# Patient Record
Sex: MALE | Race: WHITE | NOT HISPANIC OR LATINO | URBAN - METROPOLITAN AREA
[De-identification: names, ages, dates, MRNs, and addresses within clinical notes are randomized per-mention and may not be internally consistent; named-entity substitution may affect disease eponyms.]

---

## 2017-03-25 ENCOUNTER — COMMUNICATION - HEALTHEAST (OUTPATIENT)
Dept: INTERNAL MEDICINE | Facility: CLINIC | Age: 82
End: 2017-03-25

## 2017-03-25 DIAGNOSIS — I10 HYPERTENSION: ICD-10-CM

## 2017-04-09 ENCOUNTER — COMMUNICATION - HEALTHEAST (OUTPATIENT)
Dept: INTERNAL MEDICINE | Facility: CLINIC | Age: 82
End: 2017-04-09

## 2017-04-09 DIAGNOSIS — E03.9 UNSPECIFIED HYPOTHYROIDISM: ICD-10-CM

## 2017-04-10 ENCOUNTER — COMMUNICATION - HEALTHEAST (OUTPATIENT)
Dept: INTERNAL MEDICINE | Facility: CLINIC | Age: 82
End: 2017-04-10

## 2017-04-10 DIAGNOSIS — I10 ESSENTIAL HYPERTENSION: ICD-10-CM

## 2017-04-17 ENCOUNTER — COMMUNICATION - HEALTHEAST (OUTPATIENT)
Dept: INTERNAL MEDICINE | Facility: CLINIC | Age: 82
End: 2017-04-17

## 2017-04-17 DIAGNOSIS — E03.9 UNSPECIFIED HYPOTHYROIDISM: ICD-10-CM

## 2017-04-17 DIAGNOSIS — I10 ESSENTIAL HYPERTENSION: ICD-10-CM

## 2017-05-25 ENCOUNTER — OFFICE VISIT - HEALTHEAST (OUTPATIENT)
Dept: INTERNAL MEDICINE | Facility: CLINIC | Age: 82
End: 2017-05-25

## 2017-05-25 DIAGNOSIS — E11.9 DIABETES MELLITUS, TYPE 2 (H): ICD-10-CM

## 2017-05-25 LAB
CHOLEST SERPL-MCNC: 162 MG/DL
FASTING STATUS PATIENT QL REPORTED: ABNORMAL
HBA1C MFR BLD: 6 % (ref 3.5–6)
HDLC SERPL-MCNC: 42 MG/DL
LDLC SERPL CALC-MCNC: 88 MG/DL
TRIGL SERPL-MCNC: 162 MG/DL

## 2017-05-25 ASSESSMENT — MIFFLIN-ST. JEOR: SCORE: 1370.21

## 2017-05-26 ENCOUNTER — COMMUNICATION - HEALTHEAST (OUTPATIENT)
Dept: INTERNAL MEDICINE | Facility: CLINIC | Age: 82
End: 2017-05-26

## 2017-06-07 ENCOUNTER — COMMUNICATION - HEALTHEAST (OUTPATIENT)
Dept: INTERNAL MEDICINE | Facility: CLINIC | Age: 82
End: 2017-06-07

## 2017-06-23 ENCOUNTER — OFFICE VISIT - HEALTHEAST (OUTPATIENT)
Dept: INTERNAL MEDICINE | Facility: CLINIC | Age: 82
End: 2017-06-23

## 2017-06-23 DIAGNOSIS — E11.9 DIABETES MELLITUS, TYPE 2 (H): ICD-10-CM

## 2017-06-23 ASSESSMENT — MIFFLIN-ST. JEOR: SCORE: 1356.24

## 2017-06-24 ENCOUNTER — COMMUNICATION - HEALTHEAST (OUTPATIENT)
Dept: INTERNAL MEDICINE | Facility: CLINIC | Age: 82
End: 2017-06-24

## 2017-09-06 ENCOUNTER — COMMUNICATION - HEALTHEAST (OUTPATIENT)
Dept: INTERNAL MEDICINE | Facility: CLINIC | Age: 82
End: 2017-09-06

## 2017-09-26 ENCOUNTER — COMMUNICATION - HEALTHEAST (OUTPATIENT)
Dept: INTERNAL MEDICINE | Facility: CLINIC | Age: 82
End: 2017-09-26

## 2017-09-26 DIAGNOSIS — E03.9 UNSPECIFIED HYPOTHYROIDISM: ICD-10-CM

## 2017-11-16 ENCOUNTER — OFFICE VISIT - HEALTHEAST (OUTPATIENT)
Dept: INTERNAL MEDICINE | Facility: CLINIC | Age: 82
End: 2017-11-16

## 2017-11-16 ENCOUNTER — COMMUNICATION - HEALTHEAST (OUTPATIENT)
Dept: INTERNAL MEDICINE | Facility: CLINIC | Age: 82
End: 2017-11-16

## 2017-11-16 ENCOUNTER — AMBULATORY - HEALTHEAST (OUTPATIENT)
Dept: INTERNAL MEDICINE | Facility: CLINIC | Age: 82
End: 2017-11-16

## 2017-11-16 DIAGNOSIS — E11.9 DIABETES MELLITUS, TYPE 2 (H): ICD-10-CM

## 2017-11-16 DIAGNOSIS — F03.90 DEMENTIA (H): ICD-10-CM

## 2017-11-16 LAB
CHOLEST SERPL-MCNC: 138 MG/DL
FASTING STATUS PATIENT QL REPORTED: YES
HBA1C MFR BLD: 5.9 % (ref 3.5–6)
HDLC SERPL-MCNC: 34 MG/DL
LDLC SERPL CALC-MCNC: 58 MG/DL
PSA SERPL-MCNC: 0.8 NG/ML (ref 0–6.5)
TRIGL SERPL-MCNC: 230 MG/DL

## 2017-11-16 RX ORDER — DIPHENHYDRAMINE HCL 25 MG
25 CAPSULE ORAL EVERY 6 HOURS PRN
Status: SHIPPED | COMMUNITY
Start: 2017-11-16

## 2017-11-16 ASSESSMENT — MIFFLIN-ST. JEOR: SCORE: 1317.68

## 2017-11-17 ENCOUNTER — COMMUNICATION - HEALTHEAST (OUTPATIENT)
Dept: INTERNAL MEDICINE | Facility: CLINIC | Age: 82
End: 2017-11-17

## 2017-11-21 ENCOUNTER — RECORDS - HEALTHEAST (OUTPATIENT)
Dept: ADMINISTRATIVE | Facility: OTHER | Age: 82
End: 2017-11-21

## 2017-11-24 ENCOUNTER — OFFICE VISIT - HEALTHEAST (OUTPATIENT)
Dept: INTERNAL MEDICINE | Facility: CLINIC | Age: 82
End: 2017-11-24

## 2017-11-24 DIAGNOSIS — I10 ESSENTIAL HYPERTENSION: ICD-10-CM

## 2017-11-24 ASSESSMENT — MIFFLIN-ST. JEOR: SCORE: 1313.15

## 2018-02-26 ENCOUNTER — COMMUNICATION - HEALTHEAST (OUTPATIENT)
Dept: INTERNAL MEDICINE | Facility: CLINIC | Age: 83
End: 2018-02-26

## 2018-03-25 ENCOUNTER — COMMUNICATION - HEALTHEAST (OUTPATIENT)
Dept: INTERNAL MEDICINE | Facility: CLINIC | Age: 83
End: 2018-03-25

## 2018-03-25 DIAGNOSIS — N40.0 BPH (BENIGN PROSTATIC HYPERPLASIA): ICD-10-CM

## 2018-03-25 DIAGNOSIS — I10 ESSENTIAL HYPERTENSION: ICD-10-CM

## 2018-03-25 DIAGNOSIS — E03.9 HYPOTHYROIDISM: ICD-10-CM

## 2018-03-25 RX ORDER — ATENOLOL 50 MG/1
50 TABLET ORAL DAILY
Qty: 90 TABLET | Refills: 2 | Status: SHIPPED | OUTPATIENT
Start: 2018-03-25

## 2018-04-23 ENCOUNTER — COMMUNICATION - HEALTHEAST (OUTPATIENT)
Dept: INTERNAL MEDICINE | Facility: CLINIC | Age: 83
End: 2018-04-23

## 2018-04-23 ENCOUNTER — RECORDS - HEALTHEAST (OUTPATIENT)
Dept: ADMINISTRATIVE | Facility: OTHER | Age: 83
End: 2018-04-23

## 2018-05-02 ENCOUNTER — AMBULATORY - HEALTHEAST (OUTPATIENT)
Dept: INTERNAL MEDICINE | Facility: CLINIC | Age: 83
End: 2018-05-02

## 2018-05-03 ENCOUNTER — COMMUNICATION - HEALTHEAST (OUTPATIENT)
Dept: INTERNAL MEDICINE | Facility: CLINIC | Age: 83
End: 2018-05-03

## 2018-05-03 ENCOUNTER — OFFICE VISIT - HEALTHEAST (OUTPATIENT)
Dept: INTERNAL MEDICINE | Facility: CLINIC | Age: 83
End: 2018-05-03

## 2018-05-03 DIAGNOSIS — Z01.810 PREOP CARDIOVASCULAR EXAM: ICD-10-CM

## 2018-05-03 LAB
CHOLEST SERPL-MCNC: 148 MG/DL
FASTING STATUS PATIENT QL REPORTED: YES
HBA1C MFR BLD: 6.4 % (ref 3.5–6)
HDLC SERPL-MCNC: 39 MG/DL
LDLC SERPL CALC-MCNC: 76 MG/DL
POTASSIUM BLD-SCNC: 4.5 MMOL/L (ref 3.5–5)
TRIGL SERPL-MCNC: 166 MG/DL

## 2018-05-03 ASSESSMENT — MIFFLIN-ST. JEOR: SCORE: 1344.9

## 2018-05-08 ENCOUNTER — RECORDS - HEALTHEAST (OUTPATIENT)
Dept: ADMINISTRATIVE | Facility: OTHER | Age: 83
End: 2018-05-08

## 2018-05-11 ENCOUNTER — RECORDS - HEALTHEAST (OUTPATIENT)
Dept: ADMINISTRATIVE | Facility: OTHER | Age: 83
End: 2018-05-11

## 2018-05-27 ENCOUNTER — COMMUNICATION - HEALTHEAST (OUTPATIENT)
Dept: INTERNAL MEDICINE | Facility: CLINIC | Age: 83
End: 2018-05-27

## 2018-05-27 DIAGNOSIS — E78.5 HYPERLIPIDEMIA: ICD-10-CM

## 2018-05-31 ENCOUNTER — RECORDS - HEALTHEAST (OUTPATIENT)
Dept: ADMINISTRATIVE | Facility: OTHER | Age: 83
End: 2018-05-31

## 2018-06-07 ENCOUNTER — RECORDS - HEALTHEAST (OUTPATIENT)
Dept: ADMINISTRATIVE | Facility: OTHER | Age: 83
End: 2018-06-07

## 2018-06-11 ENCOUNTER — OFFICE VISIT - HEALTHEAST (OUTPATIENT)
Dept: INTERNAL MEDICINE | Facility: CLINIC | Age: 83
End: 2018-06-11

## 2018-06-11 ENCOUNTER — COMMUNICATION - HEALTHEAST (OUTPATIENT)
Dept: INTERNAL MEDICINE | Facility: CLINIC | Age: 83
End: 2018-06-11

## 2018-06-11 ENCOUNTER — OFFICE VISIT - HEALTHEAST (OUTPATIENT)
Dept: CARDIOLOGY | Facility: CLINIC | Age: 83
End: 2018-06-11

## 2018-06-11 DIAGNOSIS — I49.9 IRREGULAR HEART BEAT: ICD-10-CM

## 2018-06-11 DIAGNOSIS — E11.9 DIABETES MELLITUS, TYPE 2 (H): ICD-10-CM

## 2018-06-11 LAB
ATRIAL RATE - MUSE: 66 BPM
CHOLEST SERPL-MCNC: 143 MG/DL
DIASTOLIC BLOOD PRESSURE - MUSE: NORMAL MMHG
FASTING STATUS PATIENT QL REPORTED: YES
FASTING STATUS PATIENT QL REPORTED: YES
GLUCOSE BLD-MCNC: 145 MG/DL (ref 70–125)
HBA1C MFR BLD: 6.3 % (ref 3.5–6)
HDLC SERPL-MCNC: 37 MG/DL
INTERPRETATION ECG - MUSE: NORMAL
LDLC SERPL CALC-MCNC: 79 MG/DL
P AXIS - MUSE: 0 DEGREES
PR INTERVAL - MUSE: 240 MS
QRS DURATION - MUSE: 124 MS
QT - MUSE: 452 MS
QTC - MUSE: 473 MS
R AXIS - MUSE: -27 DEGREES
SYSTOLIC BLOOD PRESSURE - MUSE: NORMAL MMHG
T AXIS - MUSE: -6 DEGREES
TRIGL SERPL-MCNC: 135 MG/DL
VENTRICULAR RATE- MUSE: 66 BPM

## 2018-06-11 ASSESSMENT — MIFFLIN-ST. JEOR
SCORE: 1335.83
SCORE: 1331.29

## 2018-06-12 ENCOUNTER — COMMUNICATION - HEALTHEAST (OUTPATIENT)
Dept: INTERNAL MEDICINE | Facility: CLINIC | Age: 83
End: 2018-06-12

## 2018-06-24 ENCOUNTER — COMMUNICATION - HEALTHEAST (OUTPATIENT)
Dept: INTERNAL MEDICINE | Facility: CLINIC | Age: 83
End: 2018-06-24

## 2018-06-24 DIAGNOSIS — E03.9 HYPOTHYROIDISM: ICD-10-CM

## 2018-07-16 ENCOUNTER — OFFICE VISIT - HEALTHEAST (OUTPATIENT)
Dept: INTERNAL MEDICINE | Facility: CLINIC | Age: 83
End: 2018-07-16

## 2018-07-16 DIAGNOSIS — E11.9 DIABETES MELLITUS, TYPE 2 (H): ICD-10-CM

## 2018-07-16 LAB
CHOLEST SERPL-MCNC: 126 MG/DL
FASTING STATUS PATIENT QL REPORTED: ABNORMAL
FASTING STATUS PATIENT QL REPORTED: NORMAL
GLUCOSE BLD-MCNC: 108 MG/DL (ref 70–125)
HBA1C MFR BLD: 6 % (ref 3.5–6)
HDLC SERPL-MCNC: 31 MG/DL
LDLC SERPL CALC-MCNC: 63 MG/DL
TRIGL SERPL-MCNC: 161 MG/DL

## 2018-07-16 ASSESSMENT — MIFFLIN-ST. JEOR: SCORE: 1317.68

## 2018-07-17 ENCOUNTER — COMMUNICATION - HEALTHEAST (OUTPATIENT)
Dept: INTERNAL MEDICINE | Facility: CLINIC | Age: 83
End: 2018-07-17

## 2018-08-26 ENCOUNTER — COMMUNICATION - HEALTHEAST (OUTPATIENT)
Dept: INTERNAL MEDICINE | Facility: CLINIC | Age: 83
End: 2018-08-26

## 2018-08-26 DIAGNOSIS — E78.5 HYPERLIPIDEMIA: ICD-10-CM

## 2018-09-17 ENCOUNTER — OFFICE VISIT - HEALTHEAST (OUTPATIENT)
Dept: INTERNAL MEDICINE | Facility: CLINIC | Age: 83
End: 2018-09-17

## 2018-09-17 DIAGNOSIS — E11.9 DIABETES MELLITUS, TYPE 2 (H): ICD-10-CM

## 2018-09-17 DIAGNOSIS — R00.1 BRADYCARDIA: ICD-10-CM

## 2018-09-17 LAB
ATRIAL RATE - MUSE: 66 BPM
CHOLEST SERPL-MCNC: 130 MG/DL
DIASTOLIC BLOOD PRESSURE - MUSE: NORMAL MMHG
FASTING STATUS PATIENT QL REPORTED: YES
FASTING STATUS PATIENT QL REPORTED: YES
GLUCOSE BLD-MCNC: 118 MG/DL (ref 70–125)
HBA1C MFR BLD: 6.5 % (ref 3.5–6)
HDLC SERPL-MCNC: 39 MG/DL
INTERPRETATION ECG - MUSE: NORMAL
LDLC SERPL CALC-MCNC: 60 MG/DL
P AXIS - MUSE: 15 DEGREES
PR INTERVAL - MUSE: 218 MS
QRS DURATION - MUSE: 116 MS
QT - MUSE: 412 MS
QTC - MUSE: 431 MS
R AXIS - MUSE: -21 DEGREES
SYSTOLIC BLOOD PRESSURE - MUSE: NORMAL MMHG
T AXIS - MUSE: 20 DEGREES
TRIGL SERPL-MCNC: 154 MG/DL
TSH SERPL DL<=0.005 MIU/L-ACNC: 1.33 UIU/ML (ref 0.3–5)
VENTRICULAR RATE- MUSE: 66 BPM
VIT B12 SERPL-MCNC: 629 PG/ML (ref 213–816)

## 2018-09-17 ASSESSMENT — MIFFLIN-ST. JEOR: SCORE: 1313.15

## 2018-09-18 ENCOUNTER — COMMUNICATION - HEALTHEAST (OUTPATIENT)
Dept: INTERNAL MEDICINE | Facility: CLINIC | Age: 83
End: 2018-09-18

## 2018-09-21 ENCOUNTER — COMMUNICATION - HEALTHEAST (OUTPATIENT)
Dept: INTERNAL MEDICINE | Facility: CLINIC | Age: 83
End: 2018-09-21

## 2018-09-21 ENCOUNTER — OFFICE VISIT - HEALTHEAST (OUTPATIENT)
Dept: CARDIOLOGY | Facility: CLINIC | Age: 83
End: 2018-09-21

## 2018-09-21 DIAGNOSIS — I49.8 VENTRICULAR BIGEMINY: ICD-10-CM

## 2018-09-21 DIAGNOSIS — I25.10 MILD CAD: ICD-10-CM

## 2018-09-21 DIAGNOSIS — I49.3 FREQUENT PVCS: ICD-10-CM

## 2018-09-21 DIAGNOSIS — I10 ESSENTIAL HYPERTENSION: ICD-10-CM

## 2018-09-21 DIAGNOSIS — R00.1 BRADYCARDIA: ICD-10-CM

## 2018-09-21 DIAGNOSIS — E03.9 HYPOTHYROIDISM: ICD-10-CM

## 2018-09-21 ASSESSMENT — MIFFLIN-ST. JEOR: SCORE: 1317.68

## 2018-09-25 ENCOUNTER — HOSPITAL ENCOUNTER (OUTPATIENT)
Dept: CARDIOLOGY | Facility: CLINIC | Age: 83
Discharge: HOME | End: 2018-09-25
Attending: INTERNAL MEDICINE

## 2018-09-25 DIAGNOSIS — I49.8 VENTRICULAR BIGEMINY: ICD-10-CM

## 2018-09-25 DIAGNOSIS — I49.3 FREQUENT PVCS: ICD-10-CM

## 2018-09-25 DIAGNOSIS — I49.9 CARDIAC ARRHYTHMIA, UNSPECIFIED: ICD-10-CM

## 2018-09-25 LAB
AORTIC ROOT: 3.3 CM
AORTIC VALVE MEAN VELOCITY: 80.1 CM/S
ASCENDING AORTA: 3.6 CM
AV DIMENSIONLESS INDEX VTI: 0.7
AV MEAN GRADIENT: 3 MMHG
AV PEAK GRADIENT: 4.4 MMHG
AV VALVE AREA: 1.7 CM2
AV VELOCITY RATIO: 0.8
BSA FOR ECHO PROCEDURE: 1.77 M2
DOP CALC AO PEAK VEL: 105 CM/S
DOP CALC AO VTI: 25.2 CM
DOP CALC LVOT AREA: 2.54 CM2
DOP CALC LVOT DIAMETER: 1.8 CM
DOP CALC LVOT PEAK VEL: 83.2 CM/S
DOP CALC LVOT STROKE VOLUME: 42.2 CM3
DOP CALCLVOT PEAK VEL VTI: 16.6 CM
ECHO EJECTION FRACTION ESTIMATED: 50 %
EJECTION FRACTION: 44 % (ref 55–75)
FRACTIONAL SHORTENING: 33.3 % (ref 28–44)
INTERVENTRICULAR SEPTUM IN END DIASTOLE: 1 CM (ref 0.6–1)
IVS/PW RATIO: 1
LA AREA 1: 21.8 CM2
LA AREA 2: 20.3 CM2
LEFT ATRIUM LENGTH: 5.28 CM
LEFT ATRIUM SIZE: 3.4 CM
LEFT ATRIUM VOLUME INDEX: 40.2 ML/M2
LEFT ATRIUM VOLUME: 71.2 ML
LEFT VENTRICLE CARDIAC INDEX: 1.9 L/MIN/M2
LEFT VENTRICLE CARDIAC OUTPUT: 3.4 L/MIN
LEFT VENTRICLE DIASTOLIC VOLUME INDEX: 51.4 CM3/M2 (ref 34–74)
LEFT VENTRICLE DIASTOLIC VOLUME: 91 CM3 (ref 62–150)
LEFT VENTRICLE HEART RATE: 80 BPM
LEFT VENTRICLE MASS INDEX: 86.6 G/M2
LEFT VENTRICLE SYSTOLIC VOLUME INDEX: 28.8 CM3/M2 (ref 11–31)
LEFT VENTRICLE SYSTOLIC VOLUME: 51 CM3 (ref 21–61)
LEFT VENTRICULAR INTERNAL DIMENSION IN DIASTOLE: 4.5 CM (ref 4.2–5.8)
LEFT VENTRICULAR INTERNAL DIMENSION IN SYSTOLE: 3 CM (ref 2.5–4)
LEFT VENTRICULAR MASS: 153.3 G
LEFT VENTRICULAR OUTFLOW TRACT MEAN GRADIENT: 1 MMHG
LEFT VENTRICULAR OUTFLOW TRACT MEAN VELOCITY: 51.1 CM/S
LEFT VENTRICULAR OUTFLOW TRACT PEAK GRADIENT: 3 MMHG
LEFT VENTRICULAR POSTERIOR WALL IN END DIASTOLE: 1 CM (ref 0.6–1)
LV STROKE VOLUME INDEX: 23.9 ML/M2
MITRAL VALVE E/A RATIO: 0.7
MV AVERAGE E/E' RATIO: 14.1 CM/S
MV DECELERATION TIME: 250 MS
MV E'TISSUE VEL-LAT: 5.36 CM/S
MV E'TISSUE VEL-MED: 4.39 CM/S
MV LATERAL E/E' RATIO: 12.8
MV MEDIAL E/E' RATIO: 15.6
MV PEAK A VELOCITY: 93.3 CM/S
MV PEAK E VELOCITY: 68.6 CM/S
TRICUSPID VALVE ANULAR PLANE SYSTOLIC EXCURSION: 1.7 CM

## 2018-10-02 ENCOUNTER — AMBULATORY - HEALTHEAST (OUTPATIENT)
Dept: CARDIOLOGY | Facility: CLINIC | Age: 83
End: 2018-10-02

## 2018-10-02 DIAGNOSIS — I49.3 PVC'S (PREMATURE VENTRICULAR CONTRACTIONS): ICD-10-CM

## 2018-10-03 ENCOUNTER — COMMUNICATION - HEALTHEAST (OUTPATIENT)
Dept: CARDIOLOGY | Facility: CLINIC | Age: 83
End: 2018-10-03

## 2018-10-03 DIAGNOSIS — I49.3 PVC'S (PREMATURE VENTRICULAR CONTRACTIONS): ICD-10-CM

## 2018-10-08 ENCOUNTER — AMBULATORY - HEALTHEAST (OUTPATIENT)
Dept: CARDIOLOGY | Facility: CLINIC | Age: 83
End: 2018-10-08

## 2018-10-08 ENCOUNTER — COMMUNICATION - HEALTHEAST (OUTPATIENT)
Dept: INTERNAL MEDICINE | Facility: CLINIC | Age: 83
End: 2018-10-08

## 2018-10-08 DIAGNOSIS — I49.3 PVC'S (PREMATURE VENTRICULAR CONTRACTIONS): ICD-10-CM

## 2018-10-08 LAB
ATRIAL RATE - MUSE: 68 BPM
DIASTOLIC BLOOD PRESSURE - MUSE: NORMAL MMHG
INTERPRETATION ECG - MUSE: NORMAL
P AXIS - MUSE: 58 DEGREES
PR INTERVAL - MUSE: 214 MS
QRS DURATION - MUSE: 118 MS
QT - MUSE: 446 MS
QTC - MUSE: 474 MS
R AXIS - MUSE: -14 DEGREES
SYSTOLIC BLOOD PRESSURE - MUSE: NORMAL MMHG
T AXIS - MUSE: -8 DEGREES
VENTRICULAR RATE- MUSE: 68 BPM

## 2018-10-08 ASSESSMENT — MIFFLIN-ST. JEOR: SCORE: 1322.22

## 2018-10-22 ENCOUNTER — HOSPITAL ENCOUNTER (OUTPATIENT)
Dept: CARDIOLOGY | Facility: CLINIC | Age: 83
Discharge: HOME | End: 2018-10-22
Attending: INTERNAL MEDICINE

## 2018-10-22 DIAGNOSIS — I49.3 PVC'S (PREMATURE VENTRICULAR CONTRACTIONS): ICD-10-CM

## 2018-10-30 ENCOUNTER — OFFICE VISIT - HEALTHEAST (OUTPATIENT)
Dept: INTERNAL MEDICINE | Facility: CLINIC | Age: 83
End: 2018-10-30

## 2018-10-30 DIAGNOSIS — E11.9 DIABETES MELLITUS, TYPE 2 (H): ICD-10-CM

## 2018-10-30 ASSESSMENT — MIFFLIN-ST. JEOR: SCORE: 1304.07

## 2018-11-06 ENCOUNTER — COMMUNICATION - HEALTHEAST (OUTPATIENT)
Dept: CARDIOLOGY | Facility: CLINIC | Age: 83
End: 2018-11-06

## 2018-12-20 ENCOUNTER — COMMUNICATION - HEALTHEAST (OUTPATIENT)
Dept: INTERNAL MEDICINE | Facility: CLINIC | Age: 83
End: 2018-12-20

## 2018-12-20 DIAGNOSIS — N40.0 BPH (BENIGN PROSTATIC HYPERPLASIA): ICD-10-CM

## 2018-12-20 DIAGNOSIS — E03.9 HYPOTHYROIDISM: ICD-10-CM

## 2019-02-18 ENCOUNTER — COMMUNICATION - HEALTHEAST (OUTPATIENT)
Dept: INTERNAL MEDICINE | Facility: CLINIC | Age: 84
End: 2019-02-18

## 2019-03-20 ENCOUNTER — COMMUNICATION - HEALTHEAST (OUTPATIENT)
Dept: INTERNAL MEDICINE | Facility: CLINIC | Age: 84
End: 2019-03-20

## 2019-03-20 DIAGNOSIS — E03.9 HYPOTHYROIDISM: ICD-10-CM

## 2019-04-18 ENCOUNTER — OFFICE VISIT - HEALTHEAST (OUTPATIENT)
Dept: INTERNAL MEDICINE | Facility: CLINIC | Age: 84
End: 2019-04-18

## 2019-04-18 DIAGNOSIS — I49.3 PVC'S (PREMATURE VENTRICULAR CONTRACTIONS): ICD-10-CM

## 2019-04-18 DIAGNOSIS — E11.8 TYPE 2 DIABETES MELLITUS WITH COMPLICATION, WITHOUT LONG-TERM CURRENT USE OF INSULIN (H): ICD-10-CM

## 2019-04-18 LAB
CHOLEST SERPL-MCNC: 145 MG/DL
CREAT UR-MCNC: 135.3 MG/DL
FASTING STATUS PATIENT QL REPORTED: ABNORMAL
FASTING STATUS PATIENT QL REPORTED: ABNORMAL
GLUCOSE BLD-MCNC: 128 MG/DL (ref 70–125)
HBA1C MFR BLD: 6.1 % (ref 3.5–6)
HDLC SERPL-MCNC: 36 MG/DL
LDLC SERPL CALC-MCNC: 67 MG/DL
MICROALBUMIN UR-MCNC: 2.18 MG/DL (ref 0–1.99)
MICROALBUMIN/CREAT UR: 16.1 MG/G
TRIGL SERPL-MCNC: 210 MG/DL

## 2019-04-18 ASSESSMENT — MIFFLIN-ST. JEOR: SCORE: 1322.22

## 2019-04-19 ENCOUNTER — COMMUNICATION - HEALTHEAST (OUTPATIENT)
Dept: INTERNAL MEDICINE | Facility: CLINIC | Age: 84
End: 2019-04-19

## 2019-05-01 ENCOUNTER — RECORDS - HEALTHEAST (OUTPATIENT)
Dept: HEALTH INFORMATION MANAGEMENT | Facility: CLINIC | Age: 84
End: 2019-05-01

## 2019-06-18 ENCOUNTER — COMMUNICATION - HEALTHEAST (OUTPATIENT)
Dept: INTERNAL MEDICINE | Facility: CLINIC | Age: 84
End: 2019-06-18

## 2019-06-18 DIAGNOSIS — E03.9 HYPOTHYROIDISM: ICD-10-CM

## 2019-07-18 ENCOUNTER — OFFICE VISIT - HEALTHEAST (OUTPATIENT)
Dept: INTERNAL MEDICINE | Facility: CLINIC | Age: 84
End: 2019-07-18

## 2019-07-18 DIAGNOSIS — E11.8 TYPE 2 DIABETES MELLITUS WITH COMPLICATION, WITHOUT LONG-TERM CURRENT USE OF INSULIN (H): ICD-10-CM

## 2019-07-18 DIAGNOSIS — I10 ESSENTIAL HYPERTENSION: ICD-10-CM

## 2019-07-18 LAB
CHOLEST SERPL-MCNC: 153 MG/DL
FASTING STATUS PATIENT QL REPORTED: YES
FASTING STATUS PATIENT QL REPORTED: YES
GLUCOSE BLD-MCNC: 128 MG/DL (ref 70–125)
HBA1C MFR BLD: 6.1 % (ref 3.5–6)
HDLC SERPL-MCNC: 35 MG/DL
LDLC SERPL CALC-MCNC: 69 MG/DL
TRIGL SERPL-MCNC: 244 MG/DL

## 2019-07-18 ASSESSMENT — MIFFLIN-ST. JEOR: SCORE: 1313.15

## 2019-07-19 ENCOUNTER — COMMUNICATION - HEALTHEAST (OUTPATIENT)
Dept: INTERNAL MEDICINE | Facility: CLINIC | Age: 84
End: 2019-07-19

## 2019-08-25 ENCOUNTER — COMMUNICATION - HEALTHEAST (OUTPATIENT)
Dept: INTERNAL MEDICINE | Facility: CLINIC | Age: 84
End: 2019-08-25

## 2019-08-25 DIAGNOSIS — E78.5 HYPERLIPIDEMIA: ICD-10-CM

## 2019-08-25 RX ORDER — SIMVASTATIN 40 MG
TABLET ORAL
Qty: 90 TABLET | Refills: 4 | Status: SHIPPED | OUTPATIENT
Start: 2019-08-25

## 2019-09-11 ENCOUNTER — COMMUNICATION - HEALTHEAST (OUTPATIENT)
Dept: INTERNAL MEDICINE | Facility: CLINIC | Age: 84
End: 2019-09-11

## 2019-09-11 DIAGNOSIS — I49.3 PVC'S (PREMATURE VENTRICULAR CONTRACTIONS): ICD-10-CM

## 2019-10-28 ENCOUNTER — OFFICE VISIT - HEALTHEAST (OUTPATIENT)
Dept: INTERNAL MEDICINE | Facility: CLINIC | Age: 84
End: 2019-10-28

## 2019-10-28 DIAGNOSIS — E11.69 TYPE 2 DIABETES MELLITUS WITH OTHER SPECIFIED COMPLICATION, WITHOUT LONG-TERM CURRENT USE OF INSULIN (H): ICD-10-CM

## 2019-10-28 LAB
CHOLEST SERPL-MCNC: 156 MG/DL
FASTING STATUS PATIENT QL REPORTED: YES
FASTING STATUS PATIENT QL REPORTED: YES
GLUCOSE BLD-MCNC: 140 MG/DL (ref 70–125)
HBA1C MFR BLD: 6.1 % (ref 3.5–6)
HDLC SERPL-MCNC: 39 MG/DL
LDLC SERPL CALC-MCNC: 75 MG/DL
TRIGL SERPL-MCNC: 208 MG/DL

## 2019-10-28 ASSESSMENT — MIFFLIN-ST. JEOR: SCORE: 1285.93

## 2019-10-29 ENCOUNTER — COMMUNICATION - HEALTHEAST (OUTPATIENT)
Dept: INTERNAL MEDICINE | Facility: CLINIC | Age: 84
End: 2019-10-29

## 2020-01-17 ENCOUNTER — COMMUNICATION - HEALTHEAST (OUTPATIENT)
Dept: INTERNAL MEDICINE | Facility: CLINIC | Age: 85
End: 2020-01-17

## 2020-01-17 DIAGNOSIS — I49.3 PVC'S (PREMATURE VENTRICULAR CONTRACTIONS): ICD-10-CM

## 2020-01-27 ENCOUNTER — COMMUNICATION - HEALTHEAST (OUTPATIENT)
Dept: INTERNAL MEDICINE | Facility: CLINIC | Age: 85
End: 2020-01-27

## 2020-01-27 ENCOUNTER — COMMUNICATION - HEALTHEAST (OUTPATIENT)
Dept: SCHEDULING | Facility: CLINIC | Age: 85
End: 2020-01-27

## 2020-01-27 DIAGNOSIS — I49.3 PVC'S (PREMATURE VENTRICULAR CONTRACTIONS): ICD-10-CM

## 2020-01-27 DIAGNOSIS — E03.9 HYPOTHYROIDISM: ICD-10-CM

## 2020-01-27 DIAGNOSIS — N40.0 BPH (BENIGN PROSTATIC HYPERPLASIA): ICD-10-CM

## 2020-01-27 RX ORDER — TAMSULOSIN HYDROCHLORIDE 0.4 MG/1
CAPSULE ORAL
Qty: 90 CAPSULE | Refills: 3 | Status: SHIPPED | OUTPATIENT
Start: 2020-01-27

## 2020-02-04 ENCOUNTER — COMMUNICATION - HEALTHEAST (OUTPATIENT)
Dept: INTERNAL MEDICINE | Facility: CLINIC | Age: 85
End: 2020-02-04

## 2020-02-05 ENCOUNTER — COMMUNICATION - HEALTHEAST (OUTPATIENT)
Dept: INTERNAL MEDICINE | Facility: CLINIC | Age: 85
End: 2020-02-05

## 2020-02-05 DIAGNOSIS — I49.3 PVC'S (PREMATURE VENTRICULAR CONTRACTIONS): ICD-10-CM

## 2020-02-05 DIAGNOSIS — E03.9 HYPOTHYROIDISM: ICD-10-CM

## 2020-02-05 RX ORDER — DONEPEZIL HYDROCHLORIDE 5 MG/1
5 TABLET, FILM COATED ORAL AT BEDTIME
Qty: 90 TABLET | Refills: 3 | Status: SHIPPED | OUTPATIENT
Start: 2020-02-05

## 2020-02-05 RX ORDER — LEVOTHYROXINE SODIUM 50 UG/1
TABLET ORAL
Qty: 90 TABLET | Refills: 3 | Status: SHIPPED | OUTPATIENT
Start: 2020-02-05

## 2020-05-17 ENCOUNTER — COMMUNICATION - HEALTHEAST (OUTPATIENT)
Dept: INTERNAL MEDICINE | Facility: CLINIC | Age: 85
End: 2020-05-17

## 2020-05-17 DIAGNOSIS — I49.3 PVC'S (PREMATURE VENTRICULAR CONTRACTIONS): ICD-10-CM

## 2020-05-19 RX ORDER — SOTALOL HYDROCHLORIDE 80 MG/1
TABLET ORAL
Qty: 90 TABLET | Refills: 3 | Status: SHIPPED | OUTPATIENT
Start: 2020-05-19

## 2021-05-27 NOTE — PROGRESS NOTES
Office Visit - Follow up    Duane S Kilde   83 y.o. male    Date of Visit: 4/18/2019    Chief Complaint   Patient presents with     Diabetes     fasting     Hypertension       Subjective: Diabetes mellitus type 2.    Does not check Accu-Cheks at home.    Hypertension.  Questions regarding sotalol versus atenolol.    The patient was advised to continue sotalol as recommended by the cardiologist.  While on sotalol the atenolol should be discontinued.    No blood in stool or urine no chest pain or shortness of breath medication list reviewed well-tolerated normal effects.    ROS: A comprehensive review of systems was performed and was otherwise negative    Medications:  Prior to Admission medications    Medication Sig Start Date End Date Taking? Authorizing Provider   aspirin 81 MG EC tablet Take 81 mg by mouth daily.   Yes PROVIDER, HISTORICAL   atenolol (TENORMIN) 50 MG tablet Take 1 tablet (50 mg total) by mouth daily. 3/25/18  Yes Dima Hudson MD   diphenhydrAMINE (BENADRYL) 25 mg capsule Take 25 mg by mouth every 6 (six) hours as needed for itching (twice daily).    Yes PROVIDER, HISTORICAL   donepezil (ARICEPT) 5 MG tablet TAKE 1 TABLET AT BEDTIME 2/19/19  Yes Dima Hudson MD   FOLIC ACID/MULTIVITS-MIN/LUT (CENTRUM SILVER ORAL) Take by mouth.   Yes PROVIDER, HISTORICAL   levothyroxine (SYNTHROID, LEVOTHROID) 50 MCG tablet TAKE 1 TABLET DAILY 3/21/19  Yes Dima Hudson MD   simvastatin (ZOCOR) 40 MG tablet Take 1 tablet (40 mg total) by mouth at bedtime. 8/26/18  Yes Dima Hudson MD   sotalol (BETAPACE) 80 MG tablet Take 0.5 tablets (40 mg total) by mouth 2 (two) times a day. 4/18/19  Yes Dima Hudson MD   tamsulosin (FLOMAX) 0.4 mg cap TAKE 1 CAPSULE DAILY 12/21/18  Yes Dima Hudson MD   sotalol (BETAPACE) 80 MG tablet Take 0.5 tablets (40 mg total) by mouth 2 (two) times a day. 10/6/18 4/18/19 Yes Harleen Esquivel MD   indomethacin (INDOCIN) 50 MG capsule Take 1  capsule (50 mg total) by mouth daily. For gout 1/15/15   Dima Hudson MD   niacin (NIASPAN) 500 MG CR tablet Take 1 tablet by mouth.  4/18/19  PROVIDER, HISTORICAL       Allergies:   Allergies   Allergen Reactions     Niacin Diarrhea       Immunizations:   Immunization History   Administered Date(s) Administered     DT (pediatric) 06/17/2004     Influenza, inj, historic,unspecified 09/22/2009     Pneumo Conj 13-V (2010&after) 06/23/2017     Pneumo Polysac 23-V 06/17/2004     Td,adult,historic,unspecified 02/28/2011     Tdap 02/27/2014     ZOSTER, LIVE 05/22/2013       Exam Chest clear to auscultation and percussion.  Heart tones regular rhythm without murmur rub or gallop.  Abdomen soft nontender no organomegaly.  No peritoneal signs.  Extremities free of edema cyanosis or clubbing.  Neck veins nondistended no thyromegaly or scleral icterus noted, carotids full.  Skin warm and dry easily conversant good spirited.  Normal intelligence.  Neurologically intact no gross localizing findings.  Apical pulse 48 respiratory rate 18 and unlabored O2 sats 98% blood pressure 124/74 BMI ideal at 24.53.  Weight up 4 pounds.  His eyes water often.    Assessment and Plan  Diabetes mellitus type 2 with hypertension and frequent PVCs.  If the patient elects sotalol then he should stop atenolol.  Check A1c blood sugar lipid panel urine for microalbumin today.    Niacin allergy.    History of gout currently asymptomatic.    Time: total time spent with the patient was 25 minutes of which >50% was spent in counseling and coordination of care    The following high BMI interventions were performed this visit: encouragement to exercise    Dima Hudson MD    Patient Active Problem List   Diagnosis     Hypothyroidism     Type 2 Diabetes Mellitus     Hyperlipidemia     Essential Hypertension     Gout     Precordial pain     Frequent PVCs     Ventricular bigeminy     Mild CAD     Bradycardia

## 2021-05-30 NOTE — PROGRESS NOTES
Office Visit - Follow up    Duane S Kilde   84 y.o. male    Date of Visit: 7/18/2019    Chief Complaint   Patient presents with     Diabetes     fasting     Hypertension       Subjective: Diabetes mellitus type 2 with hypertension.    Last laboratory tests from April 18, 2019 reviewed.    Diabetic foot examination all clear.  The patient has cognitive decline accompanied by his wife the history is thereby limited.    On direct questioning no chest pain or shortness of breath no blood in stool or urine she denies polyuria or polydipsia.    Medication list reviewed reconciled.  Allergy to niacin.    No flares of gout there is a lesion over the third digit near the third digit meta carpal phalangeal joint MCP joint proximal is consistent with a tophi.  It is not draining it not secondarily infected or irritated not inflamed.    ROS: A comprehensive review of systems was performed and was otherwise negative    Medications:  Prior to Admission medications    Medication Sig Start Date End Date Taking? Authorizing Provider   aspirin 81 MG EC tablet Take 81 mg by mouth daily.   Yes PROVIDER, HISTORICAL   atenolol (TENORMIN) 50 MG tablet Take 1 tablet (50 mg total) by mouth daily. 3/25/18  Yes Dima Hudson MD   diphenhydrAMINE (BENADRYL) 25 mg capsule Take 25 mg by mouth every 6 (six) hours as needed for itching (twice daily).    Yes PROVIDER, HISTORICAL   donepezil (ARICEPT) 5 MG tablet TAKE 1 TABLET AT BEDTIME 2/19/19  Yes Dima Hudson MD   FOLIC ACID/MULTIVITS-MIN/LUT (CENTRUM SILVER ORAL) Take by mouth.   Yes PROVIDER, HISTORICAL   levothyroxine (SYNTHROID, LEVOTHROID) 50 MCG tablet TAKE 1 TABLET DAILY 6/19/19  Yes Dima Hudson MD   simvastatin (ZOCOR) 40 MG tablet Take 1 tablet (40 mg total) by mouth at bedtime. 8/26/18  Yes Dima Hudson MD   sotalol (BETAPACE) 80 MG tablet Take 0.5 tablets (40 mg total) by mouth 2 (two) times a day. 4/18/19  Yes Dima Hudson MD   tamsulosin  (FLOMAX) 0.4 mg cap TAKE 1 CAPSULE DAILY 12/21/18  Yes Dima Hudson MD   indomethacin (INDOCIN) 50 MG capsule Take 1 capsule (50 mg total) by mouth daily. For gout 1/15/15   Dima Hudson MD       Allergies:   Allergies   Allergen Reactions     Niacin Diarrhea       Immunizations:   Immunization History   Administered Date(s) Administered     DT (pediatric) 06/17/2004     Influenza, inj, historic,unspecified 09/22/2009     Pneumo Conj 13-V (2010&after) 06/23/2017     Pneumo Polysac 23-V 06/17/2004     Td,adult,historic,unspecified 02/28/2011     Tdap 02/27/2014     ZOSTER, LIVE 05/22/2013       Exam Chest clear to auscultation and percussion.  Heart tones regular rhythm without murmur rub or gallop.  Abdomen soft nontender no organomegaly.  No peritoneal signs.  Extremities free of edema cyanosis or clubbing.  Neck veins nondistended no thyromegaly or scleral icterus noted, carotids full.  Skin warm and dry easily conversant good spirited.  Normal intelligence.  Neurologically intact no gross localizing findings.  Male pattern baldness 120/60 pulse 4836 recheck 50.  O2 sats 97% atenolol dose is noted and recommended to be decreased also patient on Betapace it may be necessary for the patient to stop the Betapace and/or stop date atenolol because of slow heart rate.  No other slow heart rate inducing medications on board.    Assessment and Plan  Diabetes mellitus type 2 check A1c blood sugar lipid panel today.    Niacin allergy\diarrhea.    Hypertension controlled 120/60 with    Bradycardia exacerbated by atenolol and Betapace therapy.    Cognitive decline\dementia.  Wife fills in from [employer].  RTC in 3 months time fasting.    Time: total time spent with the patient was 25 minutes of which >50% was spent in counseling and coordination of care    The following high BMI interventions were performed this visit: encouragement to exercise    Dima Hudson MD    Patient Active Problem List    Diagnosis     Hypothyroidism     Type 2 Diabetes Mellitus     Hyperlipidemia     Essential Hypertension     Gout     Precordial pain     Frequent PVCs     Ventricular bigeminy     Mild CAD     Bradycardia

## 2021-05-31 VITALS — WEIGHT: 159.08 LBS | BODY MASS INDEX: 24.97 KG/M2 | HEIGHT: 67 IN

## 2021-05-31 VITALS — WEIGHT: 156 LBS | HEIGHT: 67 IN | BODY MASS INDEX: 24.48 KG/M2

## 2021-05-31 VITALS — HEIGHT: 66 IN | WEIGHT: 151 LBS | BODY MASS INDEX: 24.27 KG/M2

## 2021-05-31 VITALS — HEIGHT: 66 IN | WEIGHT: 150 LBS | BODY MASS INDEX: 24.11 KG/M2

## 2021-06-01 VITALS — WEIGHT: 157 LBS | HEIGHT: 66 IN | BODY MASS INDEX: 25.23 KG/M2

## 2021-06-01 VITALS — HEIGHT: 66 IN | BODY MASS INDEX: 24.75 KG/M2 | WEIGHT: 154 LBS

## 2021-06-01 VITALS — HEIGHT: 66 IN | BODY MASS INDEX: 24.91 KG/M2 | WEIGHT: 155 LBS

## 2021-06-01 VITALS — WEIGHT: 151 LBS | BODY MASS INDEX: 24.27 KG/M2 | HEIGHT: 66 IN

## 2021-06-01 NOTE — TELEPHONE ENCOUNTER
Medication Request  Medication name:   sotalol (BETAPACE) 80 MG tablet 45 tablet 3 4/18/2019     Sig - Route: Take 0.5 tablets (40 mg total) by mouth 2 (two) times a day. - Oral    Sent to pharmacy as: sotalol (BETAPACE) 80 MG tablet    Notes to Pharmacy: Have 12 lead ECG done in clinic on 10/8 am after 4th dose    E-Prescribing Status: Receipt confirmed by pharmacy (4/18/2019  8:58 AM CDT)        Pharmacy Name and Location: [facility]   Reason for request: Son is requesting a short script to a local [facility] as patient is out of this medication and the [facility] stated It will take at least 10 days to get this.   When did you use medication last?:  Son was calling and he found an empty bottle that alerted him to call.   Patient offered appointment:  no son is calling making this request.   Okay to leave a detailed message: yes

## 2021-06-01 NOTE — TELEPHONE ENCOUNTER
Tried calling the phone # for [place]- phone # not in service    LMOM for patient that rx was sent to his local pharmacy today

## 2021-06-02 VITALS — BODY MASS INDEX: 24.11 KG/M2 | WEIGHT: 150 LBS | HEIGHT: 66 IN

## 2021-06-02 VITALS — WEIGHT: 148 LBS | HEIGHT: 66 IN | BODY MASS INDEX: 23.78 KG/M2

## 2021-06-02 VITALS — HEIGHT: 66 IN | WEIGHT: 152 LBS | BODY MASS INDEX: 24.43 KG/M2

## 2021-06-02 VITALS — WEIGHT: 151 LBS | BODY MASS INDEX: 24.27 KG/M2 | HEIGHT: 66 IN

## 2021-06-02 NOTE — PROGRESS NOTES
Office Visit - Follow up    Duane S Kilde   84 y.o. male    Date of Visit: 10/28/2019    Chief Complaint   Patient presents with     Diabetes     fasting     Hypertension     Hyperlipidemia     Hypothyroidism       Subjective: Diabetes mellitus type 2 with hypertension and hypothyroidism.    Wife  2019 postoperatively.  She had been to [facility].    Last labs for this patient reviewed from 2019.    There are some cysts noted over the dorsal aspect of his joints at the metacarpal phalangeal joint #3.  Appears to be a ganglion cyst or synovial cyst patient reassured not hot not red.    No blood in stool or urine denies chest pain or shortness of breath.  Medication list reviewed well-tolerated no ill effects and reconciled.    One son moved back to [place] with his wife where they live near [facility].    One brother lives in [facility] and the patient himself in [facility].    ROS: A comprehensive review of systems was performed and was otherwise negative    Medications:  Prior to Admission medications    Medication Sig Start Date End Date Taking? Authorizing Provider   aspirin 81 MG EC tablet Take 81 mg by mouth daily.   Yes PROVIDER, HISTORICAL   atenolol (TENORMIN) 50 MG tablet Take 1 tablet (50 mg total) by mouth daily. 3/25/18  Yes Dima Hudson MD   diphenhydrAMINE (BENADRYL) 25 mg capsule Take 25 mg by mouth every 6 (six) hours as needed for itching (twice daily).    Yes PROVIDER, HISTORICAL   FOLIC ACID/MULTIVITS-MIN/LUT (CENTRUM SILVER ORAL) Take by mouth.   Yes PROVIDER, HISTORICAL   levothyroxine (SYNTHROID, LEVOTHROID) 50 MCG tablet TAKE 1 TABLET DAILY 19  Yes Dima Hudson MD   simvastatin (ZOCOR) 40 MG tablet TAKE 1 TABLET AT BEDTIME 19  Yes Dima Hudson MD   sotalol (BETAPACE) 80 MG tablet Take 0.5 tablets (40 mg total) by mouth 2 (two) times a day. 19  Yes Chaparro Cintron MD   donepezil (ARICEPT) 5 MG tablet TAKE 1  TABLET AT BEDTIME 19   Dima Hudson MD   indomethacin (INDOCIN) 50 MG capsule Take 1 capsule (50 mg total) by mouth daily. For gout 1/15/15   Dima Hudson MD   tamsulosin (FLOMAX) 0.4 mg cap TAKE 1 CAPSULE DAILY 18   Dima Hudson MD       Allergies:   Allergies   Allergen Reactions     Niacin Diarrhea       Immunizations:   Immunization History   Administered Date(s) Administered     DT (pediatric) 2004     Influenza, inj, historic,unspecified 2009     Influenza,trivalent,im, 65+yrs 09/10/2019     Pneumo Conj 13-V (2010&after) 2017     Pneumo Polysac 23-V 2004     Td,adult,historic,unspecified 2011     Tdap 2014     ZOSTER, LIVE 2013       Exam Chest clear to auscultation and percussion.  Heart tones regular rhythm without murmur rub or gallop.  Abdomen soft nontender no organomegaly.  No peritoneal signs.  Extremities free of edema cyanosis or clubbing.  Neck veins nondistended no thyromegaly or scleral icterus noted, carotids full.  Skin warm and dry easily conversant good spirited.  Normal intelligence.  Neurologically intact no gross localizing findings.    122/70 pulse 46 respirations 18 O2 sats 99%.  Weight down 6 pounds from previous.  Encourage adequate dietary intake plus hydration.  There is a allergy to niacin.    Assessment and Plan  Diabetes mellitus plus hypertension and hypothyroidism.  Check diabetic labs today including A1c blood sugar lipid panel.    Niacin allergy.    Grief reaction wife  2019 postoperative state condolences given to patient.    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  Or inflammation.  Patient reassured.  It is small.  Does not interfere with the function of the right hand right hand dominant.  Return to clinic in 2 to 6 months.            Dima Hudson  MD    Patient Active Problem List   Diagnosis     Hypothyroidism     Type 2 Diabetes Mellitus     Hyperlipidemia     Essential Hypertension     Gout     Precordial pain     Frequent PVCs     Ventricular bigeminy     Mild CAD     Bradycardia

## 2021-06-03 VITALS — WEIGHT: 152 LBS | HEIGHT: 66 IN | BODY MASS INDEX: 24.43 KG/M2

## 2021-06-03 VITALS
SYSTOLIC BLOOD PRESSURE: 122 MMHG | HEART RATE: 46 BPM | DIASTOLIC BLOOD PRESSURE: 70 MMHG | HEIGHT: 66 IN | WEIGHT: 144 LBS | OXYGEN SATURATION: 99 % | BODY MASS INDEX: 23.14 KG/M2

## 2021-06-03 VITALS — HEIGHT: 66 IN | WEIGHT: 150 LBS | BODY MASS INDEX: 24.11 KG/M2

## 2021-06-05 NOTE — TELEPHONE ENCOUNTER
Set up two separate prescriptions for mail order and local pharmacy. Left message to notify patient so he is aware to  meds from local pharmacy.    Jesika Castanon, CMA

## 2021-06-05 NOTE — TELEPHONE ENCOUNTER
Medication Question or Clarification  Who is calling: [facility]  What medication are you calling about (include dose and sig)?: Levothyroxine 50 mcg daily  Who prescribed the medication?: Dima Hudson MD   What is your question/concern?: Patient is out of medication.  He will need a small quantity sent to local [facility] pharmacy while waiting for medication to arrive from [facility].    Requested Pharmacy: [id number]  Okay to leave a detailed message?: No    Patient is due for lab work on thyroid.

## 2021-06-05 NOTE — TELEPHONE ENCOUNTER
FYI - Status Update  Who is Calling: [facility] - New pharmacy for patient.  Update: Patient is completely out of levothyroxine.  Please fill these and his donepezil.    Okay to leave a detailed message?:  No return call needed

## 2021-06-05 NOTE — TELEPHONE ENCOUNTER
Prescriptions set up and sent to the covering provider. To early to call patient to notify. Will call later.    Jesika Castanon, CMA

## 2021-06-05 NOTE — TELEPHONE ENCOUNTER
Medication Question or Clarification  Who is calling:   "[facility]"  What medication are you calling about (include dose and sig)?:   donepezil (ARICEPT) 5 MG tablet 90 tablet 3 1/27/2020     Sig - Route: Take 1 tablet (5 mg total) by mouth at bedtime. - Oral    Sent to pharmacy as: donepezil 5 mg tablet (ARICEPT)    E-Prescribing Status: Receipt confirmed by pharmacy (1/27/2020  4:47 PM CST)      levothyroxine (SYNTHROID, LEVOTHROID) 50 MCG tablet 90 tablet 3 1/27/2020     Sig: TAKE 1 TABLET DAILY    Sent to pharmacy as: levothyroxine 50 mcg tablet (SYNTHROID, LEVOTHROID)    E-Prescribing Status: Receipt confirmed by pharmacy (1/27/2020  2:57 PM CST)        Who prescribed the medication?:   Dima Hudson MD  What is your question/concern?:   Please send scripts to "[facility] per patient request.  Thank you.  Requested Pharmacy: "[facility]  Okay to leave a detailed message?: Yes

## 2021-06-05 NOTE — TELEPHONE ENCOUNTER
RN cannot approve Refill Request    RN can NOT refill this medication Protocol failed and NO refill given.       Marlene Golden, [facility] Triage/Med Refill 1/17/2020    Requested Prescriptions   Pending Prescriptions Disp Refills     sotalol (BETAPACE) 80 MG tablet 14 tablet 1     Sig: Take 0.5 tablets (40 mg total) by mouth 2 (two) times a day.       Sotalol Refill Protocol Failed - 1/17/2020  9:56 AM        Failed - LFT or AST or ALT on file in last 12 months     Albumin   Date Value Ref Range Status   11/16/2017 4.1 3.5 - 5.0 g/dL Final     Bilirubin, Total   Date Value Ref Range Status   11/16/2017 0.8 0.0 - 1.0 mg/dL Final     Alkaline Phosphatase   Date Value Ref Range Status   11/16/2017 60 45 - 120 U/L Final     AST   Date Value Ref Range Status   11/16/2017 32 0 - 40 U/L Final     ALT   Date Value Ref Range Status   11/16/2017 38 0 - 45 U/L Final     Protein, Total   Date Value Ref Range Status   11/16/2017 7.2 6.0 - 8.0 g/dL Final                Failed - BMP on file in last 12 months     Sodium   Date Value Ref Range Status   11/16/2017 144 136 - 145 mmol/L Final     Potassium   Date Value Ref Range Status   05/03/2018 4.5 3.5 - 5.0 mmol/L Final     Chloride   Date Value Ref Range Status   11/16/2017 110 (H) 98 - 107 mmol/L Final     CO2   Date Value Ref Range Status   11/16/2017 26 22 - 31 mmol/L Final     BUN   Date Value Ref Range Status   11/16/2017 16 8 - 28 mg/dL Final     Creatinine   Date Value Ref Range Status   11/16/2017 1.06 0.70 - 1.30 mg/dL Final             Failed - CBC w/plts (hm2) on file in last 12 months     WBC   Date Value Ref Range Status   11/16/2017 5.3 4.0 - 11.0 thou/uL Final     Hemoglobin   Date Value Ref Range Status   11/16/2017 14.4 14.0 - 18.0 g/dL Final     Hematocrit   Date Value Ref Range Status   11/16/2017 42.0 40.0 - 54.0 % Final     Platelets   Date Value Ref Range Status   11/16/2017 91 (L) 140 - 440 thou/uL Final             Failed - ECG in last 12 months      ECG rhythm strip: No results found for this or any previous visit. ECG 12 lead MUSE:   Results for orders placed or performed in visit on 10/08/18   ECG 12 lead   Result Value Ref Range    SYSTOLIC BLOOD PRESSURE  mmHg    DIASTOLIC BLOOD PRESSURE  mmHg    VENTRICULAR RATE 68 BPM    ATRIAL RATE 68 BPM    P-R INTERVAL 214 ms    QRS DURATION 118 ms    Q-T INTERVAL 446 ms    QTC CALCULATION (BEZET) 474 ms    P Axis 58 degrees    R AXIS -14 degrees    T AXIS -8 degrees    MUSE DIAGNOSIS       Sinus rhythm with 1st degree A-V block with frequent Premature ventricular complexes in a pattern of bigeminy  Incomplete right bundle branch block  Borderline ECG  When compared with ECG of 17-SEP-2018 10:19,  No significant change was found  Confirmed by JACOB CORNEJO, RANI LOC: ([id number]) on 10/8/2018 3:15:01 PM      ECG 12 lead nursing unit: No results found for this or any previous visit.          Failed - Magnesium in last 12 months     No results found for: MG           Failed - Serum creatinine in last 12 months     Creatinine   Date Value Ref Range Status   11/16/2017 1.06 0.70 - 1.30 mg/dL Final             Passed - PCP or prescribing provider visit in past 6 months or next 3 months     Last office visit with prescriber/PCP: 10/28/2019 OR same dept: 10/28/2019 Dima Hudson MD OR same specialty: 10/28/2019 Dima Hudson MD Last physical: Visit date not found Last MTM visit: Visit date not found     Next appt within 3 mo: Visit date not found  Next physical within 3 mo: Visit date not found  Prescriber OR PCP: Dima Hudson MD  Last diagnosis associated with med order: 1. PVC's (premature ventricular contractions)  - sotalol (BETAPACE) 80 MG tablet; Take 0.5 tablets (40 mg total) by mouth 2 (two) times a day.  Dispense: 14 tablet; Refill: 1    If protocol passes may refill for 6 months if within 3 months of last provider visit (or a total of 9 months).              donepezil (ARICEPT) 5 MG tablet 90 tablet  2     Sig: Take 1 tablet (5 mg total) by mouth at bedtime.       Cholinesterase Inhibitor/Anti-Alzheimer Agent Refill Protocol Passed - 1/17/2020  9:56 AM        Passed - Visit with PCP or prescribing provider visit in last 6 months or next 3 months     Last office visit with prescriber/PCP: 10/28/2019 OR same dept: 10/28/2019 Dima Hudson MD OR same specialty: 10/28/2019 Dima Hudson MD Last physical: Visit date not found Last MTM visit: Visit date not found     Next appt within 3 mo: Visit date not found  Next physical within 3 mo: Visit date not found  Prescriber OR PCP: Dima Hudson MD  Last diagnosis associated with med order: 1. PVC's (premature ventricular contractions)  - sotalol (BETAPACE) 80 MG tablet; Take 0.5 tablets (40 mg total) by mouth 2 (two) times a day.  Dispense: 14 tablet; Refill: 1    If protocol passes may refill for 6 months if within 3 months of last provider visit (or a total of 9 months).

## 2021-06-05 NOTE — TELEPHONE ENCOUNTER
FYI - Status Update  Who is Calling: [facility]  Update: Patient has been out of this medication for 2 weeks and due to [facility]  Please refill this ASAP as pharmacist stated this is his heart medication and should not be off this.  Okay to leave a detailed message?:  Yes

## 2021-06-05 NOTE — TELEPHONE ENCOUNTER
Refill Approved    Rx renewed per Medication Renewal Policy. Medication was last renewed on 18  OV 10/28/19.    Barbie Ceballos, [facility] Connection Triage/Med Refill 2020     Requested Prescriptions   Pending Prescriptions Disp Refills     tamsulosin (FLOMAX) 0.4 mg cap 90 capsule 2     Si capsule (0.4 mg total) daily.       Alfuzosin/Tamsulosin/Silodosin Refill Protocol  Passed - 2020  5:18 PM        Passed - PCP or prescribing provider visit in past 12 months       Last office visit with prescriber/PCP: Visit date not found OR same dept: Visit date not found OR same specialty: Visit date not found  Last physical: Visit date not found Last MTM visit: Visit date not found   Next visit within 3 mo: Visit date not found  Next physical within 3 mo: Visit date not found  Prescriber OR PCP: Barbie Ceballos RN  Last diagnosis associated with med order: 1. BPH (benign prostatic hyperplasia)  - tamsulosin (FLOMAX) 0.4 mg cap; 1 capsule (0.4 mg total) daily.  Dispense: 90 capsule; Refill: 2    If protocol passes may refill for 12 months if within 3 months of last provider visit (or a total of 15 months).

## 2021-06-08 NOTE — TELEPHONE ENCOUNTER
RN cannot approve Refill Request    RN can NOT refill this medication Protocol failed and NO refill given.      Marlene Golden, [facility] Triage/Med Refill 5/19/2020    Requested Prescriptions   Pending Prescriptions Disp Refills     sotaloL (BETAPACE) 80 MG tablet [Pharmacy Med Name: SOTALOL HYDROCHLORIDE  80MG  TAB] 90 tablet 3     Sig: TAKE ONE-HALF TABLET BY  MOUTH TWICE A DAY       Sotalol Refill Protocol Failed - 5/17/2020  9:29 PM        Failed - LFT or AST or ALT on file in last 12 months     Albumin   Date Value Ref Range Status   11/16/2017 4.1 3.5 - 5.0 g/dL Final     Bilirubin, Total   Date Value Ref Range Status   11/16/2017 0.8 0.0 - 1.0 mg/dL Final     Alkaline Phosphatase   Date Value Ref Range Status   11/16/2017 60 45 - 120 U/L Final     AST   Date Value Ref Range Status   11/16/2017 32 0 - 40 U/L Final     ALT   Date Value Ref Range Status   11/16/2017 38 0 - 45 U/L Final     Protein, Total   Date Value Ref Range Status   11/16/2017 7.2 6.0 - 8.0 g/dL Final                Failed - PCP or prescribing provider visit in past 6 months or next 3 months     Last office visit with prescriber/PCP: Visit date not found OR same dept: 10/28/2019 Dima Hudson MD OR same specialty: 10/28/2019 Dima Hudson MD Last physical: Visit date not found Last MTM visit: Visit date not found     Next appt within 3 mo: Visit date not found  Next physical within 3 mo: Visit date not found  Prescriber OR PCP: Dima Hudson MD  Last diagnosis associated with med order: 1. PVC's (premature ventricular contractions)  - sotaloL (BETAPACE) 80 MG tablet [Pharmacy Med Name: SOTALOL HYDROCHLORIDE  80MG  TAB]; TAKE ONE-HALF TABLET BY  MOUTH TWICE A DAY  Dispense: 90 tablet; Refill: 3    If protocol passes may refill for 6 months if within 3 months of last provider visit (or a total of 9 months).              Failed - BMP on file in last 12 months     Sodium   Date Value Ref Range Status   11/16/2017 144 136  - 145 mmol/L Final     Potassium   Date Value Ref Range Status   05/03/2018 4.5 3.5 - 5.0 mmol/L Final     Chloride   Date Value Ref Range Status   11/16/2017 110 (H) 98 - 107 mmol/L Final     CO2   Date Value Ref Range Status   11/16/2017 26 22 - 31 mmol/L Final     BUN   Date Value Ref Range Status   11/16/2017 16 8 - 28 mg/dL Final     Creatinine   Date Value Ref Range Status   11/16/2017 1.06 0.70 - 1.30 mg/dL Final             Failed - CBC w/plts (hm2) on file in last 12 months     WBC   Date Value Ref Range Status   11/16/2017 5.3 4.0 - 11.0 thou/uL Final     Hemoglobin   Date Value Ref Range Status   11/16/2017 14.4 14.0 - 18.0 g/dL Final     Hematocrit   Date Value Ref Range Status   11/16/2017 42.0 40.0 - 54.0 % Final     Platelets   Date Value Ref Range Status   11/16/2017 91 (L) 140 - 440 thou/uL Final             Failed - ECG in last 12 months     ECG rhythm strip: No results found for this or any previous visit. ECG 12 lead MUSE:   Results for orders placed or performed in visit on 10/08/18   ECG 12 lead   Result Value Ref Range    SYSTOLIC BLOOD PRESSURE  mmHg    DIASTOLIC BLOOD PRESSURE  mmHg    VENTRICULAR RATE 68 BPM    ATRIAL RATE 68 BPM    P-R INTERVAL 214 ms    QRS DURATION 118 ms    Q-T INTERVAL 446 ms    QTC CALCULATION (BEZET) 474 ms    P Axis 58 degrees    R AXIS -14 degrees    T AXIS -8 degrees    MUSE DIAGNOSIS       Sinus rhythm with 1st degree A-V block with frequent Premature ventricular complexes in a pattern of bigeminy  Incomplete right bundle branch block  Borderline ECG  When compared with ECG of 17-SEP-2018 10:19,  No significant change was found  Confirmed by RANI JAMES MD LOC: ([id number]) on 10/8/2018 3:15:01 PM      ECG 12 lead nursing unit: No results found for this or any previous visit.          Failed - Magnesium in last 12 months     No results found for: MG           Failed - Serum creatinine in last 12 months     Creatinine   Date Value Ref Range Status   11/16/2017  1.06 0.70 - 1.30 mg/dL Final

## 2021-06-11 NOTE — PROGRESS NOTES
Office Visit - Follow up    Duane S Kilde   82 y.o. male    Date of Visit: 6/23/2017    Chief Complaint   Patient presents with     Bradycardia     follow up  Tenormin decreased to 25mg       Subjective: diabetes mellitus type II.    Treated cardiac follow-up.  Pulse low 50s high 40s with last visit on atenolol 50 mg daily.  With atenolol dose decreased to 25 mg daily the patient feels better pulse is risen to 5560 range.  No syncopal spells.  Rhythm appears on exam to be in a bigeminal pattern at times.    No chest pain or shortness of breath no blood in stool or urine he does have frequent urination and is on tamsulosin for BPH with Dr. CURRY at [facility] urology.    No blood in stool or urine no chest pain shortness of breath no syncopal spells or palpitations.  Medication list reviewed generally well-tolerated.  Likes lower dose of atenolol 25 mg daily better.    ROS: A comprehensive review of systems was performed and was otherwise negative    Medications:  Prior to Admission medications    Medication Sig Start Date End Date Taking? Authorizing Provider   aspirin 81 MG EC tablet Take 81 mg by mouth daily.   Yes PROVIDER, HISTORICAL   atenolol (TENORMIN) 50 MG tablet TAKE 1 TABLET BY MOUTH DAILY.  Patient taking differently: 50 mg. TAKE 1 TABLET BY MOUTH DAILY. 4/17/17  Yes Dima Hudson MD   FOLIC ACID/MULTIVITS-MIN/LUT (CENTRUM SILVER ORAL) Take by mouth.   Yes PROVIDER, HISTORICAL   levothyroxine (SYNTHROID, LEVOTHROID) 50 MCG tablet TAKE 1 TABLET BY MOUTH DAILY. 4/17/17  Yes Dima Hudson MD   tamsulosin (FLOMAX) 0.4 mg Cp24 Take 1 capsule (0.4 mg total) by mouth daily. 4/17/17  Yes Dima Hudson MD   indomethacin (INDOCIN) 50 MG capsule Take 1 capsule (50 mg total) by mouth daily. For gout 1/15/15   Dima Hudsno MD   simvastatin (ZOCOR) 40 MG tablet TAKE 1 TABLET BY MOUTH AT BEDTIME 6/7/17   Dima Hudson MD       Allergies:   Allergies   Allergen Reactions     Niacin Diarrhea        Immunizations:   Immunization History   Administered Date(s) Administered     DT (pediatric) 06/17/2004     Influenza, inj, historic 09/22/2009     Pneumo Conj 13-V (2010&after) 06/23/2017     Pneumo Polysac 23-V 06/17/2004     Td, historic 02/28/2011     Tdap 02/27/2014     ZOSTER 05/22/2013       Exam Chest clear to auscultation and percussion.  Heart tones regular rhythm without murmur rub or gallop.  Abdomen soft nontender no organomegaly.  No peritoneal signs.  Extremities free of edema cyanosis or clubbing.  Neck veins nondistended no thyromegaly or scleral icterus noted, carotids full.  Skin warm and dry easily conversant good spirited.  Normal intelligence.  Neurologically intact no gross localizing findings.    Assessment and Plan  Bradycardia improved with lower dose of atenolol 25 mg daily.  Current pulse rate 5560.  With slight irregularity premature beats.    Hypertension controlled.    Hyperlipidemia on statin therapy.    Diabetes mellitus type 2.  Continue same meds and cares meds from and labs from May 25, 2017 reviewed in detail.  RTC November 2017.  Prior to going to [place] for the winter.    Time: total time spent with the patient was 25 minutes of which >50% was spent in counseling and coordination of care    The following high BMI interventions were performed this visit: encouragement to exercise    Dima Hudson MD    Patient Active Problem List   Diagnosis     Hypothyroidism     Type 2 Diabetes Mellitus     Hyperlipidemia     Essential Hypertension     Gout     Precordial pain

## 2021-06-14 NOTE — PROGRESS NOTES
Office Visit - Follow up    Duane S Kilde   82 y.o. male    Date of Visit: 11/24/2017    Chief Complaint   Patient presents with     Altered Mental Status     confusion    follow up       Subjective: Hypertension with diabetes mellitus type 2 and confusion.    Recent CT scan of the head unremarkable except for atrophy patient needs neurologic consultation is attended to today by his wife Makenzie who is very concerned regarding his mentation forgetfulness.    The patient's blood pressure has been slightly elevated in terms of mild systolic elevation.  Also pulse is low at 40.  Needs follow-up with internal medicine specialist in [place] where he is headed shortly.    Diabetes mellitus type 2 denies polyuria or polydipsia no symptoms of peripheral neuropathy.  No blood in stool or urine medication list reviewed generally well-tolerated.    ROS: A comprehensive review of systems was performed and was otherwise negative    Medications:  Prior to Admission medications    Medication Sig Start Date End Date Taking? Authorizing Provider   aspirin 81 MG EC tablet Take 81 mg by mouth daily.   Yes PROVIDER, HISTORICAL   atenolol (TENORMIN) 50 MG tablet TAKE 1 TABLET BY MOUTH DAILY.  Patient taking differently: 50 mg. TAKE 1 TABLET BY MOUTH DAILY. 4/17/17  Yes Dima Hudson MD   diphenhydrAMINE (BENADRYL) 25 mg capsule Take 25 mg by mouth every 6 (six) hours as needed for itching.   Yes PROVIDER, HISTORICAL   FOLIC ACID/MULTIVITS-MIN/LUT (CENTRUM SILVER ORAL) Take by mouth.   Yes PROVIDER, HISTORICAL   levothyroxine (SYNTHROID, LEVOTHROID) 50 MCG tablet TAKE 1 TABLET DAILY 9/27/17  Yes Dima Hudson MD   SAW/VIT E/SOD KARELY/LYC/BETA/PYG (PROSTATE HEALTH ORAL) Take by mouth 2 (two) times a day.   Yes PROVIDER, HISTORICAL   simvastatin (ZOCOR) 40 MG tablet TAKE 1 TABLET AT BEDTIME 11/18/17  Yes Dima Hudson MD   tamsulosin (FLOMAX) 0.4 mg Cp24 Take 1 capsule (0.4 mg total) by mouth daily. 4/17/17  Yes Dima LION  MD Tristan   indomethacin (INDOCIN) 50 MG capsule Take 1 capsule (50 mg total) by mouth daily. For gout 1/15/15   Dima Hudson MD   simvastatin (ZOCOR) 80 MG tablet Take 1 tablet (80 mg total) by mouth every evening. 4/25/16 11/24/17  Dima Hudson MD       Allergies:   Allergies   Allergen Reactions     Niacin Diarrhea       Immunizations:   Immunization History   Administered Date(s) Administered     DT (pediatric) 06/17/2004     Influenza, inj, historic,unspecified 09/22/2009     Pneumo Conj 13-V (2010&after) 06/23/2017     Pneumo Polysac 23-V 06/17/2004     Td,adult,historic,unspecified 02/28/2011     Tdap 02/27/2014     ZOSTER 05/22/2013       Exam Chest clear to auscultation and percussion.  Heart tones regular rhythm without murmur rub or gallop.  Abdomen soft nontender no organomegaly.  No peritoneal signs.  Extremities free of edema cyanosis or clubbing.  Neck veins nondistended no thyromegaly or scleral icterus noted, carotids full.  Skin warm and dry easily conversant good spirited.  Normal intelligence.  Neurologically intact no gross localizing findings.  Pulse is slow at 40 blood pressure ×2 right arm sitting 146/76.    Assessment and Plan  D. mellitus type 2 Needs Close follow-up with next visit fasting A1c blood sugar urine for microalbumin and lipid panel.    Hypertension with mild systolic elevation and bradycardia.  Decrease atenolol or Tenormin to 25 mg.    Niacin allergy.    Dementia with recent CT scan of the head done showing on November 21, 2017 cerebral atrophy and small vessel ischemic disease otherwise normal CT of head.  Needs neurologic consultation upon arrival in [place] for the winter months.    Time: total time spent with the patient was 25 minutes of which >50% was spent in counseling and coordination of care    The following high BMI interventions were performed this visit: encouragement to exercise    Dima Hudson MD    Patient Active Problem List   Diagnosis      Hypothyroidism     Type 2 Diabetes Mellitus     Hyperlipidemia     Essential Hypertension     Gout     Precordial pain

## 2021-06-14 NOTE — PROGRESS NOTES
Office Visit - Follow up    Duane S Kilde   82 y.o. male    Date of Visit: 11/16/2017    Chief Complaint   Patient presents with     Diabetes     Hypertension     Hypothyroidism       Subjective: Diabetes mellitus type 2 with hypothyroidism and hypertension.    Confusion now.  Niacin caused diarrhea.  Denies headache head trauma.  Accompanied by his wife.  They live in [facility].  They drove here this morning.  The patient denies chest pain no blood in stool or urine.  Medication list reviewed well-tolerated.  No signs or symptoms of seizure or stroke.    Colonoscopy dated January 23, 2013 showed diverticulosis and internal hemorrhoids Dr. Kaila Welch presiding.  Flu shot given earlier October October 2, 2017.    ROS: A comprehensive review of systems was performed and was otherwise negative    Medications:  Prior to Admission medications    Medication Sig Start Date End Date Taking? Authorizing Provider   aspirin 81 MG EC tablet Take 81 mg by mouth daily.   Yes PROVIDER, HISTORICAL   atenolol (TENORMIN) 50 MG tablet TAKE 1 TABLET BY MOUTH DAILY.  Patient taking differently: 50 mg. TAKE 1 TABLET BY MOUTH DAILY. 4/17/17  Yes Dima Hudson MD   diphenhydrAMINE (BENADRYL) 25 mg capsule Take 25 mg by mouth every 6 (six) hours as needed for itching.   Yes PROVIDER, HISTORICAL   FOLIC ACID/MULTIVITS-MIN/LUT (CENTRUM SILVER ORAL) Take by mouth.   Yes PROVIDER, HISTORICAL   levothyroxine (SYNTHROID, LEVOTHROID) 50 MCG tablet TAKE 1 TABLET DAILY 9/27/17  Yes Dima Hudson MD   SAW/VIT E/SOD KARELY/LYC/BETA/PYG (PROSTATE HEALTH ORAL) Take by mouth 2 (two) times a day.   Yes PROVIDER, HISTORICAL   simvastatin (ZOCOR) 40 MG tablet TAKE 1 TABLET AT BEDTIME 9/7/17  Yes Dima Hudson MD   tamsulosin (FLOMAX) 0.4 mg Cp24 Take 1 capsule (0.4 mg total) by mouth daily. 4/17/17  Yes Dima Hudson MD   indomethacin (INDOCIN) 50 MG capsule Take 1 capsule (50 mg total) by mouth daily. For gout 1/15/15    Dima Hudson MD   simvastatin (ZOCOR) 80 MG tablet Take 1 tablet (80 mg total) by mouth every evening. 4/25/16 9/7/18  Dima Hudson MD       Allergies:   Allergies   Allergen Reactions     Niacin Diarrhea       Immunizations:   Immunization History   Administered Date(s) Administered     DT (pediatric) 06/17/2004     Influenza, inj, historic,unspecified 09/22/2009     Pneumo Conj 13-V (2010&after) 06/23/2017     Pneumo Polysac 23-V 06/17/2004     Td,adult,historic,unspecified 02/28/2011     Tdap 02/27/2014     ZOSTER 05/22/2013       Exam Chest clear to auscultation and percussion.  Heart tones regular rhythm without murmur rub or gallop.  Abdomen soft nontender no organomegaly.  No peritoneal signs.  Extremities free of edema cyanosis or clubbing.  Neck veins nondistended no thyromegaly or scleral icterus noted, carotids full.  Skin warm and dry easily conversant good spirited.  Normal intelligence.  Neurologically intact no gross localizing findings.  Confused wife fills in with history.    Assessment and Plan  Diabetes mellitus type 2 with confusion.  Check comprehensive lab panel plus chest x-ray.  Lab panel to include hemogram plus comprehensive metabolic profile urinalysis lipid panel PSA TSH A1c and vitamin B 12 level.    Niacin allergy-diarrhea.    Hypothyroidism on replacement clinically euthyroid.    Hypertension controlled.    Diverticulosis with internal hemorrhoids see colonoscopy report January 23, 2013.  Also recommended neurologic consultation and will send an order for same but patient and wife [place] in about 10 days.  RTC 1 week    Time: total time spent with the patient was 40 minutes of which >50% was spent in counseling and coordination of care        Dima Hudson MD    Patient Active Problem List   Diagnosis     Hypothyroidism     Type 2 Diabetes Mellitus     Hyperlipidemia     Essential Hypertension      Gout     Precordial pain

## 2021-06-16 PROBLEM — I49.3 FREQUENT PVCS: Status: ACTIVE | Noted: 2018-09-21

## 2021-06-16 PROBLEM — I25.10 MILD CAD: Status: ACTIVE | Noted: 2018-09-21

## 2021-06-16 PROBLEM — I49.8 VENTRICULAR BIGEMINY: Status: ACTIVE | Noted: 2018-09-21

## 2021-06-16 PROBLEM — R00.1 BRADYCARDIA: Status: ACTIVE | Noted: 2018-09-21

## 2021-06-17 NOTE — PROGRESS NOTES
Office Visit - Physical    Duane S Kilde   82 y.o. male    Date of Visit: 5/3/2018    Chief Complaint   Patient presents with     Pre-op Exam     cataract bilateral 5/7/18 & 5/29/18 with Dr. Fine at [facility]       Subjective: Preoperative cardiovascular examination in anticipation of eye surgery specifically cataracts first on May 7, 2018.  Dr. Fine.    82-year-old male with diabetes and niacin allergy retired with some memory difficulties.  Presents with impaired vision.  Accompanied by his wife.    Non-smoker rare alcohol 2-3 beers per week.  Allergies niacin diarrhea.    ROS: A comprehensive review of systems was performed and was otherwise negative    Medications:   Prior to Admission medications    Medication Sig Start Date End Date Taking? Authorizing Provider   aspirin 81 MG EC tablet Take 81 mg by mouth daily.   Yes PROVIDER, HISTORICAL   atenolol (TENORMIN) 50 MG tablet Take 1 tablet (50 mg total) by mouth daily. 3/25/18  Yes Dima Hudson MD   diphenhydrAMINE (BENADRYL) 25 mg capsule Take 25 mg by mouth every 6 (six) hours as needed for itching.   Yes PROVIDER, HISTORICAL   FOLIC ACID/MULTIVITS-MIN/LUT (CENTRUM SILVER ORAL) Take by mouth.   Yes PROVIDER, HISTORICAL   indomethacin (INDOCIN) 50 MG capsule Take 1 capsule (50 mg total) by mouth daily. For gout 1/15/15  Yes Dima Hudson MD   levothyroxine (SYNTHROID, LEVOTHROID) 50 MCG tablet Take 1 tablet (50 mcg total) by mouth daily. 3/25/18  Yes Dima Hudson MD   SAW/VIT E/SOD KARELY/LYC/BETA/PYG (PROSTATE HEALTH ORAL) Take by mouth 2 (two) times a day.   Yes PROVIDER, HISTORICAL   simvastatin (ZOCOR) 40 MG tablet TAKE 1 TABLET AT BEDTIME 2/27/18  Yes Dima Hudson MD   tamsulosin (FLOMAX) 0.4 mg Cp24 Take 1 capsule (0.4 mg total) by mouth daily. 3/25/18  Yes Dima Hudson MD       Allergies:  Allergies   Allergen Reactions     Niacin Diarrhea       Immunizations:   Immunization History   Administered  Date(s) Administered     DT (pediatric) 2004     Influenza, inj, historic,unspecified 2009     Pneumo Conj 13-V (2010&after) 2017     Pneumo Polysac 23-V 2004     Td,adult,historic,unspecified 2011     Tdap 2014     ZOSTER, LIVE 2013       Health Maintenance: Immunizations reviewed and up-to-date.    Past Medical History: Diabetes mellitus type 2 from insulin resistance and obesity.    Hypertension and hyperlipidemia and hypothyroidism treated.    Past Surgical History: None    Family History: Mother  of a nervous condition age 54.    Father  74 after a stroke and myocardial infarction.  2 children well 3 grandchildren one  in infancy age born a half months while in [place].    Social History: Retired lives in [facility].    Exam Chest clear to auscultation and percussion.  Heart tones regular rhythm without murmur rub or gallop.  Abdomen soft nontender no organomegaly.  No peritoneal signs.  Extremities free of edema cyanosis or clubbing.  Neck veins nondistended no thyromegaly or scleral icterus noted, carotids full.  Skin warm and dry easily conversant good spirited.  Normal intelligence.  Neurologically intact no gross localizing findings.  Heart tones are irregularly irregular.  There are no carotid bruits male pattern baldness easily conversant pleasant smiling not in acute distress not toxic.  Color good accompanied by his wife who is very supportive.    Assessment and Plan  Cataract surgery bilaterally preoperatively we will check potassium A1c lipid panel and EKG.  Medically acceptable risk for anticipated surgery.  Other diagnoses include diabetes mellitus type 2 with hypothyroidism and hypertension and hyperlipidemia.    Niacin allergy.  Diarrhea.    Cognitive decline mild wife supported.    Total time spent with the patient today was 40 minutes of which greater than 50% was spent in counseling and coordination of care.    Dima Hudson,  MD    Patient Active Problem List   Diagnosis     Hypothyroidism     Type 2 Diabetes Mellitus     Hyperlipidemia     Essential Hypertension     Gout     Precordial pain

## 2021-06-18 NOTE — PROGRESS NOTES
[facility] Care Note    Assessment / Plan:    Mr Gordon is doing well from the cardiac standpoint, and was noted recently to have an irregular heart rhythm.    The patient himself has not noticed any palpitations, and denies any other cardiac symptoms.    His EKG in the clinic today showed normal sinus rhythm with ventricular bigeminy.    Given the absence of symptoms, and a stress test in 2015 showing no significant ischemia and normal left ventricular systolic function, his occasional ventricular ectopy is a benign finding, and would not warrant further workup or therapy.    He has been asked to continue his current medical regimen, and knows to call if there is a change in his condition or new symptoms.    Thank you for the opportunity to participate in the care of Duane S Kilde. Please do not hesitate to call with any questions or concerns regarding his cardiovascular status.    ______________________________________________________________________    Subjective:    It was a pleasure to see Duane S Kilde at the [facility] at the request of Dr. Cortés for evaluation of an irregular heart rhythm.     Duane S Kilde is a 82 y.o. male who states that he has been doing well, denies any palpitations chest discomfort or shortness of breath.    He was noted at his last visit with Dr. Hudson to have an irregular heart rhythm, and is referred for further evaluation.  Mr Gordon states that he has not noticed any symptoms on his own, and feels well, able to carry out all his activities as tolerated.    His wife does report that he is starting to have some dementia, and that has started to limit him somewhat.    Mr Gordon's prior EKG from 2016 was reviewed, and shows sinus rhythm with ventricular trigeminy.  He had another EKG performed in the clinic today, which showed normal sinus rhythm with ventricular bigeminy.  ______________________________________________________________________    Problem  List:  Patient Active Problem List   Diagnosis     Hypothyroidism     Type 2 Diabetes Mellitus     Hyperlipidemia     Essential Hypertension     Gout     Precordial pain       Medical History:  Past Medical History:   Diagnosis Date     Borderline diabetes      Hyperlipidemia      Hypertension        Surgical History:  No past surgical history on file.    Social History:  Social History     Social History     Marital status:      Spouse name: N/A     Number of children: N/A     Years of education: N/A     Occupational History     Not on file.     Social History Main Topics     Smoking status: Never Smoker     Smokeless tobacco: Never Used     Alcohol use 1.8 oz/week     3 Cans of beer per week     Drug use: No     Sexual activity: Not on file     Other Topics Concern     Not on file     Social History Narrative       Review of Systems: 12 organ system review done and negative except as noted in the HPI.    Family History:  Family History   Problem Relation Age of Onset     Stroke Father          Allergies:  Allergies   Allergen Reactions     Niacin Diarrhea       Medications:  Current Outpatient Prescriptions   Medication Sig Dispense Refill     aspirin 81 MG EC tablet Take 81 mg by mouth daily.       atenolol (TENORMIN) 50 MG tablet Take 1 tablet (50 mg total) by mouth daily. 90 tablet 2     diphenhydrAMINE (BENADRYL) 25 mg capsule Take 25 mg by mouth every 6 (six) hours as needed for itching.       FOLIC ACID/MULTIVITS-MIN/LUT (CENTRUM SILVER ORAL) Take by mouth.       indomethacin (INDOCIN) 50 MG capsule Take 1 capsule (50 mg total) by mouth daily. For gout 90 capsule 2     levothyroxine (SYNTHROID, LEVOTHROID) 50 MCG tablet Take 1 tablet (50 mcg total) by mouth daily. 90 tablet 0     SAW/VIT E/SOD KARELY/LYC/BETA/PYG (PROSTATE HEALTH ORAL) Take by mouth 2 (two) times a day.       simvastatin (ZOCOR) 40 MG tablet Take 1 tablet (40 mg total) by mouth at bedtime. 90 tablet 0     tamsulosin (FLOMAX) 0.4 mg Cp24  "Take 1 capsule (0.4 mg total) by mouth daily. 90 capsule 2     niacin (NIASPAN) 500 MG CR tablet Take 1 tablet by mouth.       No current facility-administered medications for this visit.        Objective:   Vital signs:  /82 (Patient Site: Left Arm, Patient Position: Sitting, Cuff Size: Adult Regular)  Pulse (!) 52  Resp 16  Ht 5' 6\" (1.676 m)  Wt 155 lb (70.3 kg)  BMI 25.02 kg/m2      Physical Exam:    GENERAL APPEARANCE: Alert, cooperative and in no acute distress.  HEENT: No scleral icterus. No Xanthelasma. Oral mucuos membranes pink and moist.  NECK: No JVD. Thyroid not visualized  CHEST: clear to auscultation  CARDIOVASCULAR: S1, S2 regular. No significant murmur noted.   PULSES: Radial and posterior tibial pulses are intact and symmetric.   ABDOMEN: Nontender. BS+.   EXTREMITIES: No cyanosis, clubbing or edema.  SKIN: Warm, well perfused  NEURO: Grossly nonfocal    Lab Results:  LIPIDS:  Lab Results   Component Value Date    CHOL 148 05/03/2018    CHOL 138 11/16/2017    CHOL 162 05/25/2017     Lab Results   Component Value Date    HDL 39 (L) 05/03/2018    HDL 34 (L) 11/16/2017    HDL 42 05/25/2017     Lab Results   Component Value Date    LDLCALC 76 05/03/2018    LDLCALC 58 11/16/2017    LDLCALC 88 05/25/2017     Lab Results   Component Value Date    TRIG 166 (H) 05/03/2018    TRIG 230 (H) 11/16/2017    TRIG 162 (H) 05/25/2017     No components found for: CHOLHDL    BMP:  Lab Results   Component Value Date    CREATININE 1.06 11/16/2017    BUN 16 11/16/2017     11/16/2017    K 4.5 05/03/2018     (H) 11/16/2017    CO2 26 11/16/2017         ECG independently reviewed      JOSUE OSEI MD  [facility]              "

## 2021-06-18 NOTE — PROGRESS NOTES
Office Visit - Follow up    Duane S Kilde   82 y.o. male    Date of Visit: 6/11/2018    Chief Complaint   Patient presents with     Memory Loss     saw Neuroogist 5/2018     Medication Questions     aricept  and fish oil       Subjective: Diabetes mellitus type 2.    This a.m. saw cardiologist Dr. Walden.  Labs done May 3, 2018.    Seen by Dr. Ricardo Schofield neurologist for memory loss dementia.    Aricept recommended which I concur with 5 mg daily may increase to 10 mg daily at night if the patient and family feel that it is improving his memory.    Questions regarding fish oil answered.    No blood in stool or urine no chest pain or shortness of breath medication list reviewed well-tolerated.    ROS: A comprehensive review of systems was performed and was otherwise negative    Medications:  Prior to Admission medications    Medication Sig Start Date End Date Taking? Authorizing Provider   aspirin 81 MG EC tablet Take 81 mg by mouth daily.   Yes PROVIDER, HISTORICAL   atenolol (TENORMIN) 50 MG tablet Take 1 tablet (50 mg total) by mouth daily. 3/25/18  Yes Dima Hudson MD   FOLIC ACID/MULTIVITS-MIN/LUT (CENTRUM SILVER ORAL) Take by mouth.   Yes PROVIDER, HISTORICAL   levothyroxine (SYNTHROID, LEVOTHROID) 50 MCG tablet Take 1 tablet (50 mcg total) by mouth daily. 3/25/18  Yes Dima Hudson MD   niacin (NIASPAN) 500 MG CR tablet Take 1 tablet by mouth.   Yes PROVIDER, HISTORICAL   simvastatin (ZOCOR) 40 MG tablet Take 1 tablet (40 mg total) by mouth at bedtime. 5/28/18  Yes Dima Hudson MD   tamsulosin (FLOMAX) 0.4 mg Cp24 Take 1 capsule (0.4 mg total) by mouth daily. 3/25/18  Yes Dima Hudson MD   diphenhydrAMINE (BENADRYL) 25 mg capsule Take 25 mg by mouth every 6 (six) hours as needed for itching.    PROVIDER, HISTORICAL   donepezil (ARICEPT) 5 MG tablet Take 1 tablet (5 mg total) by mouth at bedtime. 6/11/18   Dima Hudson MD   indomethacin (INDOCIN) 50 MG capsule Take 1 capsule (50  mg total) by mouth daily. For gout 1/15/15   Dima Hudson MD   SAW/VIT E/SOD KARELY/LYC/BETA/PYG (PROSTATE HEALTH ORAL) Take by mouth 2 (two) times a day.  6/11/18  PROVIDER, HISTORICAL       Allergies:   Allergies   Allergen Reactions     Niacin Diarrhea       Immunizations:   Immunization History   Administered Date(s) Administered     DT (pediatric) 06/17/2004     Influenza, inj, historic,unspecified 09/22/2009     Pneumo Conj 13-V (2010&after) 06/23/2017     Pneumo Polysac 23-V 06/17/2004     Td,adult,historic,unspecified 02/28/2011     Tdap 02/27/2014     ZOSTER, LIVE 05/22/2013       Exam Chest clear to auscultation and percussion.  Heart tones regular rhythm without murmur rub or gallop.  Abdomen soft nontender no organomegaly.  No peritoneal signs.  Extremities free of edema cyanosis or clubbing.  Neck veins nondistended no thyromegaly or scleral icterus noted, carotids full.  Skin warm and dry easily conversant good spirited.  Normal intelligence.  Neurologically intact no gross localizing findings.  History limited by dementia.  Wife is present feels and and very supportive.  Makenzie.    Assessment and Plan  Diabetes mellitus type 2 check A1c blood sugar lipid panel today.    Memory decline.  Dementia.  Start Aricept 5 mg daily #30 with 2 refills may increase to 10 mg at bedtime if the patient and family see benefit.    Niacin allergy.    Hypothyroidism on replacement clinically euthyroid.    Hypertension with mild systolic elevation 150/70 recheck 150/70.  O2 sats today 97% pulse 50 regular.  Return to clinic 1 month's time.  May need adjustment downward on Tenormin dose as well as readjustment of other medications.  Discussed in detail with the patient and his wife Makenzie supports him today and very supportive.    Time: total time spent with the patient was 25 minutes of which >50% was spent in counseling and coordination of care    The following high BMI interventions were performed this visit:  encouragement to exercise    Dima Hudson MD    Patient Active Problem List   Diagnosis     Hypothyroidism     Type 2 Diabetes Mellitus     Hyperlipidemia     Essential Hypertension     Gout     Precordial pain

## 2021-06-19 NOTE — LETTER
Letter by Dima Hudson MD at      Author: Dima Hudson MD Service: -- Author Type: --    Filed:  Encounter Date: 10/29/2019 Status: Signed         Duane S Kilde   [address] [facility] [address]             October 29, 2019         Dear Mr. Gordon,    Below are the results from your recent visit:    Resulted Orders   Glycosylated Hemoglobin A1c   Result Value Ref Range    Hemoglobin A1c 6.1 (H) 3.5 - 6.0 %   Glucose   Result Value Ref Range    Glucose 140 (H) 70 - 125 mg/dL    Patient Fasting > 8hrs? Yes     Narrative    Fasting Glucose reference range is 70-99 mg/dL per  American Diabetes Association (ADA) guidelines.   Lipid Cascade   Result Value Ref Range    Cholesterol 156 <=199 mg/dL    Triglycerides 208 (H) <=149 mg/dL    HDL Cholesterol 39 (L) >=40 mg/dL    LDL Calculated 75 <=129 mg/dL    Patient Fasting > 8hrs? Yes        All very good results    Please call with questions or contact us using [employer].    Sincerely,        Electronically signed by Dima Hudson MD

## 2021-06-19 NOTE — PROGRESS NOTES
Office Visit - Follow up    Duane S Kilde   83 y.o. male    Date of Visit: 7/16/2018    Chief Complaint   Patient presents with     Diabetes     Hypertension     Memory Loss       Subjective: Diabetes mellitus type 2.  Fasting today.  Labs pending including A1c blood sugar lipid panel.    Quit Aricept because of side effects could sleep.  Was on low dose 5 mg daily.  Family thinks he is losing his memory.  His [facility] to this address here in [place].  The patient himself gave his son the directions.    Hypertension denies headache dizziness lightheadedness.  No blood in stool or urine no chest pain or shortness of breath.  Ate some peppermint and developed some slight xiphoid of tenderness or pain.  Dyspepsia?.  And acids recommended OTC.    No blood in stool or urine.    Medication list reviewed well-tolerated    ROS: A comprehensive review of systems was performed and was otherwise negative    Medications:  Prior to Admission medications    Medication Sig Start Date End Date Taking? Authorizing Provider   aspirin 81 MG EC tablet Take 81 mg by mouth daily.   Yes PROVIDER, HISTORICAL   atenolol (TENORMIN) 50 MG tablet Take 1 tablet (50 mg total) by mouth daily. 3/25/18  Yes Dima Hudson MD   diphenhydrAMINE (BENADRYL) 25 mg capsule Take 25 mg by mouth every 6 (six) hours as needed for itching.   Yes PROVIDER, HISTORICAL   FOLIC ACID/MULTIVITS-MIN/LUT (CENTRUM SILVER ORAL) Take by mouth.   Yes PROVIDER, HISTORICAL   levothyroxine (SYNTHROID, LEVOTHROID) 50 MCG tablet TAKE 1 TABLET DAILY 6/24/18  Yes Dima Hudson MD   niacin (NIASPAN) 500 MG CR tablet Take 1 tablet by mouth.   Yes PROVIDER, HISTORICAL   simvastatin (ZOCOR) 40 MG tablet Take 1 tablet (40 mg total) by mouth at bedtime. 5/28/18  Yes Dima Hudson MD   tamsulosin (FLOMAX) 0.4 mg Cp24 Take 1 capsule (0.4 mg total) by mouth daily. 3/25/18  Yes Dima Hudson MD   indomethacin  (INDOCIN) 50 MG capsule Take 1 capsule (50 mg total) by mouth daily. For gout 1/15/15   Dima Hudson MD   donepezil (ARICEPT) 5 MG tablet Take 1 tablet (5 mg total) by mouth at bedtime. 6/11/18 7/16/18  Dima Hudson MD       Allergies:   Allergies   Allergen Reactions     Niacin Diarrhea       Immunizations:   Immunization History   Administered Date(s) Administered     DT (pediatric) 06/17/2004     Influenza, inj, historic,unspecified 09/22/2009     Pneumo Conj 13-V (2010&after) 06/23/2017     Pneumo Polysac 23-V 06/17/2004     Td,adult,historic,unspecified 02/28/2011     Tdap 02/27/2014     ZOSTER, LIVE 05/22/2013       Exam Chest clear to auscultation and percussion.  Heart tones regular rhythm without murmur rub or gallop.  Abdomen soft nontender no organomegaly.  No peritoneal signs.  Extremities free of edema cyanosis or clubbing.  Neck veins nondistended no thyromegaly or scleral icterus noted, carotids full.  Skin warm and dry easily conversant good spirited.  Normal intelligence.  Neurologically intact no gross localizing findings.  Mild epigastric subxiphoid tenderness especially left of the midline less so today than previous.  Not exertionally related.  And inframammary.  No associated shortness of breath.    Assessment and Plan  Diabetes mellitus type 2 check A1c blood sugar lipid panel.    Memory difficulties.  Quit Aricept on his home because of related to same.    Niacin allergy.  Diarrhea.    Hyperlipidemia on statin therapy no history of MI or stroke.    Hypertension controlled with hypothyroidism the latter on replacement.  Well-tolerated.    Intolerance to Aricept 5 mg at night insomnia.  Patient quit after taking 3 doses.  RTC 2 months time.  May be able to DCR decrease Niaspan already on simvastatin and go home.    Time: total time spent with the patient was 25 minutes of which >50% was spent in counseling and coordination of care    The following high BMI interventions were  performed this visit: encouragement to exercise    Dima Hudson MD    Patient Active Problem List   Diagnosis     Hypothyroidism     Type 2 Diabetes Mellitus     Hyperlipidemia     Essential Hypertension     Gout     Precordial pain

## 2021-06-19 NOTE — LETTER
Letter by Dima Hudson MD at      Author: Dima Hudson MD Service: -- Author Type: --    Filed:  Encounter Date: 7/19/2019 Status: (Other)         Duane S Kilde   [address] [facility] [address]             July 19, 2019         Dear Mr. Gordon,    Below are the results from your recent visit:    Resulted Orders   Glycosylated Hemoglobin A1c   Result Value Ref Range    Hemoglobin A1c 6.1 (H) 3.5 - 6.0 %   Glucose   Result Value Ref Range    Glucose 128 (H) 70 - 125 mg/dL    Patient Fasting > 8hrs? Yes     Narrative    Fasting Glucose reference range is 70-99 mg/dL per  American Diabetes Association (ADA) guidelines.   Lipid Cascade   Result Value Ref Range    Cholesterol 153 <=199 mg/dL    Triglycerides 244 (H) <=149 mg/dL    HDL Cholesterol 35 (L) >=40 mg/dL    LDL Calculated 69 <=129 mg/dL    Patient Fasting > 8hrs? Yes        All very good results    Please call with questions or contact us using [employer].    Sincerely,        Electronically signed by Dima Hudson MD

## 2021-06-19 NOTE — LETTER
Letter by Dima Hudson MD at      Author: Dima Hudson MD Service: -- Author Type: --    Filed:  Encounter Date: 4/19/2019 Status: (Other)         Duane S Kilde   [address] [facility] [address]             April 19, 2019         Dear Mr. Gordon,    Below are the results from your recent visit:    Resulted Orders   Glycosylated Hemoglobin A1c   Result Value Ref Range    Hemoglobin A1c 6.1 (H) 3.5 - 6.0 %   Glucose   Result Value Ref Range    Glucose 128 (H) 70 - 125 mg/dL    Patient Fasting > 8hrs? Unknown     Narrative    Fasting Glucose reference range is 70-99 mg/dL per  American Diabetes Association (ADA) guidelines.   Microalbumin, Random Urine   Result Value Ref Range    Microalbumin, Random Urine 2.18 (H) 0.00 - 1.99 mg/dL    Creatinine, Urine 135.3 mg/dL    Microalbumin/Creatinine Ratio Random Urine 16.1 <=19.9 mg/g    Narrative    Microalbumin, Random Urine  <2.0 mg/dL . . . . . . . . Normal  3.0-30.0 mg/dL . . . . . . Microalbuminuria  >30.0 mg/dL . . . . . .  . Clinical Proteinuria  Microalbumin/Creatinine Ratio, Random Urine  <20 mg/g . . . . .. . . . Normal   mg/g . . . . . . . Microalbuminuria  >300 mg/g . . . . . . . . Clinical Proteinuria   Lipid Cascade   Result Value Ref Range    Cholesterol 145 <=199 mg/dL    Triglycerides 210 (H) <=149 mg/dL    HDL Cholesterol 36 (L) >=40 mg/dL    LDL Calculated 67 <=129 mg/dL    Patient Fasting > 8hrs? Unknown        All very good results    Please call with questions or contact us using [employer].    Sincerely,        Electronically signed by Dima Hudson MD

## 2021-06-20 NOTE — PROGRESS NOTES
"Patient present to the clinic today for follow up Qt check after recent start of Sotalol. Patient has had 6 doses of sotalol prior to EKG. He current sotalol dose is 40 mg two times a day since 10/5/18.    Patient states he/she is doing \"well\" and denies lightheadedness, dizziness or fatigue.     EKG reveals Sinus rhythm with 1st degree AV block with frequent PVC's in a pattern of bigeminy.   HR 68   ms   ms  QT/QTc  446/474    Dr. Esquivel informed of frequent PVC's, she requested patient is to have holter monitor repeated in 2-3 weeks.     Patient was instructed provider will review and if new orders are given a return call will be made. Patient verbalizes understanding and agrees with plan. Patient will follow up as planned. No further questions or concerns.    Dr. Esquivel,  Please review EKG.   Any further recommendations or instructions?      "

## 2021-06-20 NOTE — PROGRESS NOTES
"Thank you for asking the [facility] Care team to see Duane S Kilde in consultation.      Assessment/Plan:     Frequent PVCs, will quantify with a 24 hour holter and get an echo to rule out LV dysfunction. TSH recently normal, as was his potassium.     I am able to palpate his PVCs in his radial pulse so they are perfusing.    F/U 1 year but will call with results/recommendations prior to them going to [place] November 1st.       Current History:   Duane S Kilde is a 83 y.o. with some dementia, mild CAD on CT scan in 2015, diabetes, hypertension, hyperlipidemia and PVCs in a pattern of bigeminy for at least 2 years by EKG review. Duane was seen by Dr. Walden for a similar EKG this summer. He is moderately active putzing around his house, doing some cleaning and organizing without difficulty. Some times he \"runs out of gas\" but it is really hard to pinpoint what activities cause this or how often it happens. He saw Dr. Hudsno on Monday and his palpitated pulse was in the 30s. Atenolol was stopped and he was sent to see us.     Duane denies palpitations, lightheadedness or syncope. No pnd/orthopnea, edema or early satiety. His exercise tolerance is unchanged over the past year.     Past Medical History:     Past Medical History:   Diagnosis Date     Borderline diabetes      Hyperlipidemia      Hypertension        Past Surgical History:   History reviewed. No pertinent surgical history.    Family History:     Family History   Problem Relation Age of Onset     Stroke Father        Social History:    reports that he has never smoked. He has never used smokeless tobacco. He reports that he drinks about 3.6 oz of alcohol per week  He reports that he does not use illicit drugs.    Meds:     Current Outpatient Prescriptions   Medication Sig Note     aspirin 81 MG EC tablet Take 81 mg by mouth daily.      diphenhydrAMINE (BENADRYL) 25 mg capsule Take 25 mg by mouth every 6 (six) hours as needed for itching (twice daily).  " "     FOLIC ACID/MULTIVITS-MIN/LUT (CENTRUM SILVER ORAL) Take by mouth.      indomethacin (INDOCIN) 50 MG capsule Take 1 capsule (50 mg total) by mouth daily. For gout      levothyroxine (SYNTHROID, LEVOTHROID) 50 MCG tablet TAKE 1 TABLET DAILY      niacin (NIASPAN) 500 MG CR tablet Take 1 tablet by mouth. 6/11/2018: Received from: [facility] Received Sig: Take 1 tablet by mouth daily.     simvastatin (ZOCOR) 40 MG tablet Take 1 tablet (40 mg total) by mouth at bedtime.      tamsulosin (FLOMAX) 0.4 mg Cp24 Take 1 capsule (0.4 mg total) by mouth daily.      atenolol (TENORMIN) 50 MG tablet Take 1 tablet (50 mg total) by mouth daily.        Allergies:   Niacin    Review of Systems:   Review of Systems:   General: WNL  Eyes: WNL  Ears/Nose/Throat: WNL  Lungs: Snoring  Heart: WNL  Stomach: WNL  Bladder: WNL  Muscle/Joints: WNL  Skin: WNL  Nervous System: WNL  Mental Health: WNL     Blood: WNL       Objective:      Physical Exam  @LASTENCWT:3@  5' 6\" (1.676 m)  @BMI:3@  /80 (Patient Site: Left Arm, Patient Position: Sitting, Cuff Size: Adult Large)  Pulse 76  Resp 18  Ht 5' 6\" (1.676 m)  Wt 151 lb (68.5 kg)  BMI 24.37 kg/m2    General Appearance:   Alert, cooperative and in no acute distress.   HEENT:  No scleral icterus; the mucous membranes were pink and moist.   Neck: JVP flat. No thyromegaly. No HJR   Chest: The spine was straight. The chest was symmetric.   Lungs:   Respirations unlabored; the lungs are clear to auscultation.   Cardiovascular:   S1 and S2 normal with ectopy and without murmur. No clicks or rubs. No carotid bruits noted. Right DP, PT, and radial pulses 2+. Left DP, PT, and radial pulses 2+.   Abdomen:  No organomegaly, masses, bruits, or tenderness. Bowels sounds are present   Extremities: No cyanosis, clubbing, or edema.   Skin: No xanthelasma.   Neurologic: Mood and affect are appropriate.         Lab Review   Lab Results   Component Value Date     11/16/2017     04/22/2016 "     07/07/2014    K 4.5 05/03/2018    K 5.0 11/16/2017    K 4.2 04/22/2016     (H) 11/16/2017     (H) 04/22/2016     07/07/2014    CO2 26 11/16/2017    CO2 23 04/22/2016    CO2 28 07/07/2014    BUN 16 11/16/2017    BUN 19 04/22/2016    BUN 22 07/07/2014    CREATININE 1.06 11/16/2017    CREATININE 1.19 04/22/2016    CREATININE 1.50 (H) 07/07/2014    CALCIUM 9.5 11/16/2017    CALCIUM 9.9 04/22/2016    CALCIUM 9.2 07/07/2014     Lab Results   Component Value Date    WBC 5.3 11/16/2017    WBC 4.2 04/22/2016    WBC 4.4 07/07/2014    HGB 14.4 11/16/2017    HGB 13.1 (L) 04/22/2016    HGB 13.8 (L) 07/07/2014    HCT 42.0 11/16/2017    HCT 39.2 (L) 04/22/2016    HCT 40.3 07/07/2014    MCV 98 11/16/2017     04/22/2016     (H) 07/07/2014    PLT 91 (L) 11/16/2017     (L) 04/22/2016     (L) 07/07/2014     Lab Results   Component Value Date    CHOL 130 09/17/2018    CHOL 126 07/16/2018    CHOL 143 06/11/2018    TRIG 154 (H) 09/17/2018    TRIG 161 (H) 07/16/2018    TRIG 135 06/11/2018    HDL 39 (L) 09/17/2018    HDL 31 (L) 07/16/2018    HDL 37 (L) 06/11/2018     No results found for: DAVID Esquivel M.D.

## 2021-06-20 NOTE — PROGRESS NOTES
Office Visit - Follow up    Duane S Kilde   83 y.o. male    Date of Visit: 9/17/2018    Chief Complaint   Patient presents with     Hypertension     Hyperlipidemia     Diabetes     Medication Questions     fenofibrate and Donepezil       Subjective: Diabetes mellitus type 2 with hypertension and hyperlipidemia.    Questions regarding fenofibrate and Aricept.  Patient stopped Aricept after 1 pill because of GI upset.  He has cognitive decline and dementia his history is limited.  No fainting spells or lightheadedness no loss of consciousness.  Denies chest pain or shortness of breath.  No blood in stool or urine medication list reviewed generally well-tolerated.  The patient is on beta-blocker therapy specifically Tenormin 50 mg daily he may have cut the dose in half to 25 mg daily because of cognitive decline and dementia it is uncertain as to exact dose.  No other medications seem to have a beta-blocker effect of bradycardia on this patient.  The patient also uses Flomax tamsulosin for BPH and symptoms of prostatism.    ROS: A comprehensive review of systems was performed and was otherwise negative    Medications:  Prior to Admission medications    Medication Sig Start Date End Date Taking? Authorizing Provider   aspirin 81 MG EC tablet Take 81 mg by mouth daily.   Yes PROVIDER, HISTORICAL   atenolol (TENORMIN) 50 MG tablet Take 1 tablet (50 mg total) by mouth daily. 3/25/18  Yes Dima Hudson MD   diphenhydrAMINE (BENADRYL) 25 mg capsule Take 25 mg by mouth every 6 (six) hours as needed for itching (twice daily).    Yes PROVIDER, HISTORICAL   FOLIC ACID/MULTIVITS-MIN/LUT (CENTRUM SILVER ORAL) Take by mouth.   Yes PROVIDER, HISTORICAL   levothyroxine (SYNTHROID, LEVOTHROID) 50 MCG tablet TAKE 1 TABLET DAILY 6/24/18  Yes Dima Hudson MD   niacin (NIASPAN) 500 MG CR tablet Take 1 tablet by mouth.   Yes PROVIDER, HISTORICAL   simvastatin (ZOCOR) 40 MG tablet Take 1 tablet (40 mg total) by mouth at  bedtime. 8/26/18  Yes Dima Hudson MD   tamsulosin (FLOMAX) 0.4 mg Cp24 Take 1 capsule (0.4 mg total) by mouth daily. 3/25/18  Yes Dima Hudson MD   indomethacin (INDOCIN) 50 MG capsule Take 1 capsule (50 mg total) by mouth daily. For gout 1/15/15   Dima Hudson MD       Allergies:   Allergies   Allergen Reactions     Niacin Diarrhea       Immunizations:   Immunization History   Administered Date(s) Administered     DT (pediatric) 06/17/2004     Influenza, inj, historic,unspecified 09/22/2009     Pneumo Conj 13-V (2010&after) 06/23/2017     Pneumo Polysac 23-V 06/17/2004     Td,adult,historic,unspecified 02/28/2011     Tdap 02/27/2014     ZOSTER, LIVE 05/22/2013       Exam Chest clear to auscultation and percussion.  Heart tones regular rhythm without murmur rub or gallop.  Abdomen soft nontender no organomegaly.  No peritoneal signs.  Extremities free of edema cyanosis or clubbing.  Neck veins nondistended no thyromegaly or scleral icterus noted, carotids full.  Skin warm and dry easily conversant good spirited.  Normal intelligence.  Neurologically intact no gross localizing findings.  Apical pulse distant 36.  O2 sats 98% blood pressure 130/60.    Assessment and Plan  Diabetes mellitus type 2 check TSH plus vitamin B12 level blood sugar lipid panel A1c.    Bradycardia severe with apical pulse rate 36 stop Tenormin.  Recheck patient in 1 month's time anticipates travel to [place] for the winter months but I would like to see the patient in 1 month's time for recheck of his apical pulse and EKG.    Hypothyroidism on replacement clinically euthyroid.    Niacin allergy diarrhea.    Hyperlipidemia on statin therapy no history of MI or stroke.    Gout currently asymptomatic.    BPH on Flomax probably not a contributing to bradycardia.    Time: total time spent with the patient was 40 minutes of which >50% was spent in counseling and coordination of care    The following high BMI interventions were  performed this visit: encouragement to exercise    Dima Hudson MD    Patient Active Problem List   Diagnosis     Hypothyroidism     Type 2 Diabetes Mellitus     Hyperlipidemia     Essential Hypertension     Gout     Precordial pain

## 2021-06-21 NOTE — PROGRESS NOTES
Office Visit - Follow up    Duane S Kilde   83 y.o. male    Date of Visit: 10/30/2018    Chief Complaint   Patient presents with     Diabetes     Hypothyroidism     Hypertension       Subjective: Diabetes mellitus type 2 with hypothyroidism and hypertension.    Last laboratory test from September 17, 2018 reviewed.  Echocardiogram from October 2, 2018 reviewed as well.  The patient was sent for cardiac consultation because of bradycardia.  The patient's heart rate had been in the 30s.  He is still using atenolol.  Sotalol was started his ejection fraction was about 50% the patient did have frequent PVCs.  The patient left ventricular size and echocardiogram was normal and the right ventricular size and function were normal ejection fraction was mildly decreased on the left side with the left ventricular ejection fraction is 50% mild mitral regurgitation was noted.    He is allergic to niacin causing diarrhea.    No blood in stool or urine no chest pain shortness of breath near syncope syncope or palpitations.  He is accompanied by his wife today there is some cognitive decline.  The patient and his wife are traveling first to [place] then to [place] and leaving today.  I suggested that they contact cardiology before departure regarding his bradycardia see below today heart rates ranged from 32-42 in the office but asymptomatic.    ROS: A comprehensive review of systems was performed and was otherwise negative    Medications:  Prior to Admission medications    Medication Sig Start Date End Date Taking? Authorizing Provider   aspirin 81 MG EC tablet Take 81 mg by mouth daily.   Yes PROVIDER, HISTORICAL   atenolol (TENORMIN) 50 MG tablet Take 1 tablet (50 mg total) by mouth daily. 3/25/18  Yes Dima Hudson MD   diphenhydrAMINE (BENADRYL) 25 mg capsule Take 25 mg by mouth every 6 (six) hours as needed for itching (twice daily).    Yes PROVIDER, HISTORICAL   FOLIC ACID/MULTIVITS-MIN/LUT (CENTRUM SILVER ORAL) Take by  mouth.   Yes PROVIDER, HISTORICAL   levothyroxine (SYNTHROID, LEVOTHROID) 50 MCG tablet TAKE 1 TABLET DAILY 9/22/18  Yes Dmia Hudson MD   niacin (NIASPAN) 500 MG CR tablet Take 1 tablet by mouth.   Yes PROVIDER, HISTORICAL   simvastatin (ZOCOR) 40 MG tablet Take 1 tablet (40 mg total) by mouth at bedtime. 8/26/18  Yes Dima Hudson MD   sotalol (BETAPACE) 80 MG tablet Take 0.5 tablets (40 mg total) by mouth 2 (two) times a day. 10/6/18  Yes Harleen Esquivel MD   tamsulosin (FLOMAX) 0.4 mg Cp24 Take 1 capsule (0.4 mg total) by mouth daily. 3/25/18  Yes Dima Hudson MD   indomethacin (INDOCIN) 50 MG capsule Take 1 capsule (50 mg total) by mouth daily. For gout 1/15/15   Dima Hudson MD       Allergies:   Allergies   Allergen Reactions     Niacin Diarrhea       Immunizations:   Immunization History   Administered Date(s) Administered     DT (pediatric) 06/17/2004     Influenza, inj, historic,unspecified 09/22/2009     Pneumo Conj 13-V (2010&after) 06/23/2017     Pneumo Polysac 23-V 06/17/2004     Td,adult,historic,unspecified 02/28/2011     Tdap 02/27/2014     ZOSTER, LIVE 05/22/2013       Exam Chest clear to auscultation and percussion.  Heart tones regular rhythm without murmur rub or gallop.  Abdomen soft nontender no organomegaly.  No peritoneal signs.  Extremities free of edema cyanosis or clubbing.  Neck veins nondistended no thyromegaly or scleral icterus noted, carotids full.  Skin warm and dry easily conversant good spirited.  Normal intelligence.  Neurologically intact no gross localizing findings.    Assessment and Plan  Diabetes mellitus type 2 offered lab test patient declined.    Cardiomyopathy with mildly depressed systolic ejection fraction left ventricle 50%.    Bradycardia extreme.  Heart rate from 32-42 by auscultation apically today.    O2 sats 98% blood pressure excellent 132/64 respiratory rate 18 unlabored.  The patient was advised to contact cardiology before departure  for [place] [facility].  Stop atenolol continue low-dose sotalol.    Hypothyroidism on replacement therapy clinically euthyroid.    Hypertension controlled.  132/64.  BMI ideal at 24.  RTC as needed spring 2019 upon his return home from [place].    Time: total time spent with the patient was 40 minutes of which >50% was spent in counseling and coordination of care        Dima Hudson MD    Patient Active Problem List   Diagnosis     Hypothyroidism     Type 2 Diabetes Mellitus     Hyperlipidemia     Essential Hypertension     Gout     Precordial pain     Frequent PVCs     Ventricular bigeminy     Mild CAD     Bradycardia

## 2021-07-03 NOTE — ADDENDUM NOTE
Addendum Note by Analia Pichardo CMA at 9/17/2018 10:35 AM     Author: Analia Pichardo CMA Service: -- Author Type: Certified Medical Assistant    Filed: 9/17/2018 10:35 AM Encounter Date: 9/17/2018 Status: Signed    : Analia Pichardo CMA (Certified Medical Assistant)    Addended by: ANALIA PICHARDO on: 9/17/2018 10:35 AM        Modules accepted: Orders

## 2021-07-03 NOTE — ADDENDUM NOTE
Addendum Note by Kelvin Nagel MD at 9/17/2018 10:26 AM     Author: Kelvin Nagel MD Service: -- Author Type: Physician    Filed: 9/17/2018 10:26 AM Encounter Date: 9/17/2018 Status: Signed    : Kelvin Nagel MD (Physician)    Addended by: KELVIN NAGEL on: 9/17/2018 10:26 AM        Modules accepted: Orders

## 2022-08-22 NOTE — PROGRESS NOTES
Office Visit - Follow up    Duane S Kilde   81 y.o. male    Date of Visit: 5/25/2017    Chief Complaint   Patient presents with     Diabetes     A1C due     Hypertension     Ear Pain     Left sided. Moves into temple       Subjective: Chief complaint diabetes mellitus type 2 plus hypertension and hyperlipidemia.    Left ear pain negative exam.  Seasonal allergies with excessive sneezing discussed feels well otherwise.    Denies chest pain shortness of breath no blood in stool or urine medication list reviewed generally well-tolerated.    ROS: A comprehensive review of systems was performed and was otherwise negative    Medications:  Prior to Admission medications    Medication Sig Start Date End Date Taking? Authorizing Provider   aspirin 81 MG EC tablet Take 81 mg by mouth daily.   Yes PROVIDER, HISTORICAL   atenolol (TENORMIN) 50 MG tablet TAKE 1 TABLET BY MOUTH DAILY. 4/17/17  Yes Dima Hudson MD   FOLIC ACID/MULTIVITS-MIN/LUT (CENTRUM SILVER ORAL) Take by mouth.   Yes PROVIDER, HISTORICAL   indomethacin (INDOCIN) 50 MG capsule Take 1 capsule (50 mg total) by mouth daily. For gout 1/15/15  Yes Dima Hudson MD   levothyroxine (SYNTHROID, LEVOTHROID) 50 MCG tablet TAKE 1 TABLET BY MOUTH DAILY. 4/17/17  Yes Dima Hudson MD   simvastatin (ZOCOR) 40 MG tablet TAKE 1 TABLET BY MOUTH DAILY. 7/20/16  Yes Dima Hudson MD   tamsulosin (FLOMAX) 0.4 mg Cp24 Take 1 capsule (0.4 mg total) by mouth daily. 4/17/17  Yes Dima Hudson MD   fenofibrate (TRICOR) 145 MG tablet TAKE 1 TABLET BY MOUTH DAILY. 11/22/16 5/25/17  Dima Hudson MD       Allergies:   Allergies   Allergen Reactions     Niacin Diarrhea       Immunizations:   Immunization History   Administered Date(s) Administered     DT (pediatric) 06/17/2004     Influenza, inj, historic 09/22/2009     Pneumo Polysac 23-V 06/17/2004     Td, historic 02/28/2011     Tdap 02/27/2014     ZOSTER 05/22/2013       Exam left ear canal drum  clear minimal ceruminosis.  Chest clear heart tones ir- regular rhythm rate 47.  Abdomen benign extremities free of edema neck veins nondistended does not feel faint or lightheaded.  Feels well otherwise without chest pain or shortness of breath no syncopal spells.  No palpitations.    Assessment and Plan  Diabetes mellitus type 2 check A1c lipid panel blood sugar urine for microalbumin.    Bradycardia exacerbated by atenolol therapy will decrease Tenormin to 25 mg daily.  With next office visit we will recheck heart rate blood pressure consider electrocardiographic testing and cardiac consultation.    Hypothyroidism on replacement therapy clinically euthyroid.    Hyperlipidemia on statin therapy no history of MI or stroke    Time: total time spent with the patient was 25 minutes of which >50% was spent in counseling and coordination of care    The following high BMI interventions were performed this visit: encouragement to exercise    Dima Hudson MD    Patient Active Problem List   Diagnosis     Hypothyroidism     Type 2 Diabetes Mellitus     Hyperlipidemia     Essential Hypertension     Gout     Precordial pain      3-5x/week

## 2025-04-12 NOTE — TELEPHONE ENCOUNTER
RN Assessment/Reason for Call:   Okay to leave Detailed Message  Pharmacy calling in, request Dr Hudson for Tamulosin was bringing in bottle in form ail order.  Patient is out.   tamsulosin (FLOMAX) 0.4 mg cap 90 capsule 2 12/21/2018  No   Sig: TAKE 1 CAPSULE DAILY   Sent to pharmacy as: tamsulosin (FLOMAX) 0.4 mg cap   E-Prescribing Status: Receipt confirmed by pharmacy (12/21/2018  8:18 AM CST)     RN Action/Disposition:  Will send to refill team  Agrees to plan.     Barbie Ceballos, ARASH    [facility] Triage/med refill  1/27/2020  5:17 PM       General